# Patient Record
Sex: FEMALE | Race: WHITE | NOT HISPANIC OR LATINO | Employment: FULL TIME | URBAN - METROPOLITAN AREA
[De-identification: names, ages, dates, MRNs, and addresses within clinical notes are randomized per-mention and may not be internally consistent; named-entity substitution may affect disease eponyms.]

---

## 2017-02-16 ENCOUNTER — OFFICE VISIT (OUTPATIENT)
Dept: MEDICAL GROUP | Facility: MEDICAL CENTER | Age: 42
End: 2017-02-16
Attending: FAMILY MEDICINE
Payer: MEDICAID

## 2017-02-16 ENCOUNTER — HOSPITAL ENCOUNTER (OUTPATIENT)
Dept: LAB | Facility: MEDICAL CENTER | Age: 42
End: 2017-02-16
Attending: FAMILY MEDICINE
Payer: MEDICAID

## 2017-02-16 VITALS
SYSTOLIC BLOOD PRESSURE: 121 MMHG | BODY MASS INDEX: 29.25 KG/M2 | OXYGEN SATURATION: 99 % | TEMPERATURE: 97.5 F | RESPIRATION RATE: 18 BRPM | DIASTOLIC BLOOD PRESSURE: 79 MMHG | HEART RATE: 80 BPM | WEIGHT: 182 LBS | HEIGHT: 66 IN

## 2017-02-16 DIAGNOSIS — R05.9 COUGH: ICD-10-CM

## 2017-02-16 DIAGNOSIS — R42 DIZZINESS: ICD-10-CM

## 2017-02-16 DIAGNOSIS — R68.89 SENSATION OF FEELING COLD: ICD-10-CM

## 2017-02-16 DIAGNOSIS — J02.9 SORE THROAT: ICD-10-CM

## 2017-02-16 LAB
BASOPHILS # BLD AUTO: 0.04 K/UL (ref 0–0.12)
BASOPHILS NFR BLD AUTO: 0.5 % (ref 0–1.8)
EOSINOPHIL # BLD: 0.14 K/UL (ref 0–0.51)
EOSINOPHIL NFR BLD AUTO: 1.7 % (ref 0–6.9)
ERYTHROCYTE [DISTWIDTH] IN BLOOD BY AUTOMATED COUNT: 49 FL (ref 35.9–50)
HCT VFR BLD AUTO: 41.3 % (ref 37–47)
HETEROPH AB SER QL LA: NEGATIVE
HGB BLD-MCNC: 13.6 G/DL (ref 12–16)
IMM GRANULOCYTES # BLD AUTO: 0.01 K/UL (ref 0–0.11)
IMM GRANULOCYTES NFR BLD AUTO: 0.1 % (ref 0–0.9)
INT CON NEG: NORMAL
INT CON POS: NORMAL
LYMPHOCYTES # BLD: 1.74 K/UL (ref 1–4.8)
LYMPHOCYTES NFR BLD AUTO: 21 % (ref 22–41)
MCH RBC QN AUTO: 31.5 PG (ref 27–33)
MCHC RBC AUTO-ENTMCNC: 32.9 G/DL (ref 33.6–35)
MCV RBC AUTO: 95.6 FL (ref 81.4–97.8)
MONOCYTES # BLD: 0.44 K/UL (ref 0–0.85)
MONOCYTES NFR BLD AUTO: 5.3 % (ref 0–13.4)
NEUTROPHILS # BLD: 5.9 K/UL (ref 2–7.15)
NEUTROPHILS NFR BLD AUTO: 71.4 % (ref 44–72)
NRBC # BLD AUTO: 0 K/UL
NRBC BLD-RTO: 0 /100 WBC
PLATELET # BLD AUTO: 205 K/UL (ref 164–446)
PMV BLD AUTO: 11.2 FL (ref 9–12.9)
RBC # BLD AUTO: 4.32 M/UL (ref 4.2–5.4)
S PYO AG THROAT QL: NORMAL
T4 FREE SERPL-MCNC: 0.77 NG/DL (ref 0.53–1.43)
TSH SERPL DL<=0.005 MIU/L-ACNC: 2.62 UIU/ML (ref 0.3–3.7)
WBC # BLD AUTO: 8.3 K/UL (ref 4.8–10.8)

## 2017-02-16 PROCEDURE — 99214 OFFICE O/P EST MOD 30 MIN: CPT | Performed by: FAMILY MEDICINE

## 2017-02-16 PROCEDURE — 36415 COLL VENOUS BLD VENIPUNCTURE: CPT

## 2017-02-16 PROCEDURE — 86308 HETEROPHILE ANTIBODY SCREEN: CPT

## 2017-02-16 PROCEDURE — 99212 OFFICE O/P EST SF 10 MIN: CPT | Performed by: FAMILY MEDICINE

## 2017-02-16 PROCEDURE — 84439 ASSAY OF FREE THYROXINE: CPT

## 2017-02-16 PROCEDURE — 85025 COMPLETE CBC W/AUTO DIFF WBC: CPT

## 2017-02-16 PROCEDURE — 87880 STREP A ASSAY W/OPTIC: CPT | Performed by: FAMILY MEDICINE

## 2017-02-16 PROCEDURE — 84443 ASSAY THYROID STIM HORMONE: CPT

## 2017-02-16 RX ORDER — AMOXICILLIN AND CLAVULANATE POTASSIUM 875; 125 MG/1; MG/1
1 TABLET, FILM COATED ORAL 2 TIMES DAILY
Qty: 20 TAB | Refills: 0 | Status: SHIPPED | OUTPATIENT
Start: 2017-02-16 | End: 2017-03-21

## 2017-02-16 NOTE — MR AVS SNAPSHOT
"        Sadaf Nunez   2017 1:50 PM   Office Visit   MRN: [id number]    [facility]   Dept Phone:  [phone]    Description:  Female : 1975   Provider:  Penny Silverio M.D.           Reason for Visit     Chills     Fever           Allergies as of 2017     Allergen Noted Reactions    Keflex [Cephalexin-Fd&C #2-Fd&C Yellow #10] 2016       Yeast infection      You were diagnosed with     Sore throat   [215100]       Dizziness   [982453]       Sensation of feeling cold   [791890]       Cough   [786.2.ICD-9-CM]         Vital Signs     Blood Pressure Pulse Temperature Respirations Height Weight    121/79 mmHg 80 36.4 °C (97.5 °F) 18 1.676 m (5' 5.98\") 82.555 kg (182 lb)    Body Mass Index Oxygen Saturation Last Menstrual Period Breastfeeding? Smoking Status       29.39 kg/m2 99% 2016 No Current Every Day Smoker       Basic Information     Date Of Birth Sex Race Ethnicity Preferred Language    1975 Female White Non- English      Problem List              ICD-10-CM Priority Class Noted - Resolved    Flank pain R10.9   2016 - Present    Anxiety F41.9   2016 - Present    Depression F32.9   2016 - Present    History of abnormal cervical Pap smear Z87.898   2016 - Present    Insomnia G47.00   2016 - Present    Hemorrhagic ovarian cyst N83.209   2016 - Present    Pain in the chest R07.9   2016 - Present    Dysfunctional uterine bleeding N93.8   2016 - Present    Irregular menstrual cycle N92.6   5/3/2016 - Present      Health Maintenance        Date Due Completion Dates    IMM DTaP/Tdap/Td Vaccine (1 - Tdap) 1994 ---    MAMMOGRAM 2015 ---    IMM INFLUENZA (1) 2016 ---    PAP SMEAR 2019            Current Immunizations     No immunizations on file.      Below and/or attached are the medications your provider expects you to take. Review all of your home medications and newly ordered " medications with your provider and/or pharmacist. Follow medication instructions as directed by your provider and/or pharmacist. Please keep your medication list with you and share with your provider. Update the information when medications are discontinued, doses are changed, or new medications (including over-the-counter products) are added; and carry medication information at all times in the event of emergency situations     Allergies:  KEFLEX - (reactions not documented)               Medications  Valid as of: February 16, 2017 -  2:02 PM    Generic Name Brand Name Tablet Size Instructions for use    Amoxicillin-Pot Clavulanate (Tab) AUGMENTIN 875-125 MG Take 1 Tab by mouth 2 times a day.        Ibuprofen (Tab) MOTRIN 600 MG Take 1 Tab by mouth every 6 hours as needed.        PARoxetine HCl (Tab) PAXIL 30 MG Take 1 Tab by mouth every day.        TraZODone HCl (Tab) DESYREL 100 MG Take 1 Tab by mouth 1 time daily as needed for Sleep.        .                 Medicines prescribed today were sent to:     [address] [address]    Phone: [phone] Fax: [phone]    Open 24 Hours?: No      Medication refill instructions:       If your prescription bottle indicates you have medication refills left, it is not necessary to call your provider’s office. Please contact your pharmacy and they will refill your medication.    If your prescription bottle indicates you do not have any refills left, you may request refills at any time through one of the following ways: The online U[employer] Mobile system (except Urgent Care), by calling your provider’s office, or by asking your pharmacy to contact your provider’s office with a refill request. Medication refills are processed only during regular business hours and may not be available until the next business day. Your provider may request additional information or to have a follow-up visit with you prior to refilling  your medication.   *Please Note: Medication refills are assigned a new Rx number when refilled electronically. Your pharmacy may indicate that no refills were authorized even though a new prescription for the same medication is available at the pharmacy. Please request the medicine by name with the pharmacy before contacting your provider for a refill.        Your To Do List     Future Labs/Procedures Complete By Expires    CBC WITH DIFFERENTIAL  As directed 2/16/2018    FREE THYROXINE  As directed 2/16/2018    MONONUCLEOSIS TEST QUAL  As directed 2/16/2018    TSH  As directed 2/16/2018         Giftbar Access Code: [email or web address]  Expires: 3/18/2017  2:02 PM    Giftbar  A secure, online tool to manage your health information     [employer]’s Giftbar® is a secure, online tool that connects you to your personalized health information from the privacy of your home -- day or night - making it very easy for you to manage your healthcare. Once the activation process is completed, you can even access your medical information using the Giftbar avni, which is available for free in the Apple Avni store or Google Play store.     Giftbar provides the following levels of access (as shown below):   My Chart Features   [facility] Doctor [facility]  Specialists [facility]  Urgent  Care Non-[facility]  Primary Care  Doctor   Email your healthcare team securely and privately 24/7 X X X    Manage appointments: schedule your next appointment; view details of past/upcoming appointments X      Request prescription refills. X      View recent personal medical records, including lab and immunizations X X X X   View health record, including health history, allergies, medications X X X X   Read reports about your outpatient visits, procedures, consult and ER notes X X X X   See your discharge summary, which is a recap of your hospital and/or ER visit that includes your diagnosis, lab results, and care plan. X X       How to register for  MyChart:  1. Go to  [email or web address].  2. Click on the Sign Up Now box, which takes you to the New Member Sign Up page. You will need to provide the following information:  a. Enter your [employer] Access Code exactly as it appears at the top of this page. (You will not need to use this code after you’ve completed the sign-up process. If you do not sign up before the expiration date, you must request a new code.)   b. Enter your date of birth.   c. Enter your home email address.   d. Click Submit, and follow the next screen’s instructions.  3. Create a Jiujiuweikangt ID. This will be your [employer] login ID and cannot be changed, so think of one that is secure and easy to remember.  4. Create a Jiujiuweikangt password. You can change your password at any time.  5. Enter your Password Reset Question and Answer. This can be used at a later time if you forget your password.   6. Enter your e-mail address. This allows you to receive e-mail notifications when new information is available in [employer].  7. Click Sign Up. You can now view your health information.    For assistance activating your [employer] account, call [phone]

## 2017-02-16 NOTE — PROGRESS NOTES
Chief Complaint:   Chief Complaint   Patient presents with   • Chills   • Fever       HPI: Established patient, here today accompanied with her mother in law  Sadaf Nunez is a 41 y.o. female who presents for evaluation of acute symptoms of sore throat, fever, cough     Sore throat  Reports having the sore throat for the past 4 days, patient said it's more on the left side, radiating to her ear, admits having contact with sick people in the house, patient said there are 11 people in an apartment. Reports hoarseness of voice. Fever at home. When she checked it at home.    Dizziness  Reports that she sometimes feels dizzy, especially when she gets up. Recently couple of months ago underwent vaginal hysterectomy. She said she was advised to check her hemoglobin because of feeling cold and feeling dizzy     Sensation of feeling cold  Patient says she feels cold all the time, also reports having constipation.     Cough  Patient reports cough for at least 2 weeks now, but she said overall she's not been feeling well for the past couple of months, cough is productive dark in color sputum with streaks of blood. Sometimes, associated with subjective fever, sick contacts. Denies chest pain or palpitations or shortness of breath            Past medical history, family history, social history and medications reviewed and updated in the record. Today   Current medications, problem list and allergies reviewed in EPIC today   Health maintenance topics are reviewed and updated.    Patient Active Problem List    Diagnosis Date Noted   • Irregular menstrual cycle 05/03/2016   • Pain in the chest 04/21/2016   • Dysfunctional uterine bleeding 04/21/2016   • Hemorrhagic ovarian cyst 02/23/2016   • History of abnormal cervical Pap smear 02/05/2016   • Insomnia 02/05/2016   • Flank pain 01/29/2016   • Anxiety 01/29/2016   • Depression 01/29/2016     Family History   Problem Relation Age of Onset   • Diabetes Mother    •  "Psychiatry Mother    • Cancer Maternal Grandfather      cancer stomach     Social History     Social History   • Marital Status: Single     Spouse Name: N/A   • Number of Children: N/A   • Years of Education: N/A     Occupational History   • Not on file.     Social History Main Topics   • Smoking status: Current Every Day Smoker -- 1.50 packs/day for 20 years     Types: Cigarettes   • Smokeless tobacco: Not on file   • Alcohol Use: 0.0 oz/week     0 Standard drinks or equivalent per week      Comment: \"Maybe once a month\"   • Drug Use: No   • Sexual Activity:     Partners: Male     Other Topics Concern   • Not on file     Social History Narrative     Current Outpatient Prescriptions   Medication Sig Dispense Refill   • amoxicillin-clavulanate (AUGMENTIN) 875-125 MG Tab Take 1 Tab by mouth 2 times a day. 20 Tab 0   • ibuprofen (MOTRIN) 600 MG Tab Take 1 Tab by mouth every 6 hours as needed. 30 Tab 3   • paroxetine (PAXIL) 30 MG Tab Take 1 Tab by mouth every day. 30 Tab 11   • trazodone (DESYREL) 100 MG Tab Take 1 Tab by mouth 1 time daily as needed for Sleep. 30 Tab 5     No current facility-administered medications for this visit.           Review Of Systems  As documented in HPI above  PHYSICAL EXAMINATION:    /79 mmHg  Pulse 80  Temp(Src) 36.4 °C (97.5 °F)  Resp 18  Ht 1.676 m (5' 5.98\")  Wt 82.555 kg (182 lb)  BMI 29.39 kg/m2  SpO2 99%  LMP 05/01/2016  Breastfeeding? No  Gen.: Well-developed, well-nourished, no apparent distress, pleasant and cooperative with the examination  HEENT: Normocephalic/atraumatic, sinuses nontender with palpation, TMs clear, nares patent with pink mucosa and clear rhinorrhea, noted mild throat erythema without exudate     Neck: No JVD or bruits, no adenopathy  Cor: Regular rate and rhythm without murmur gallop or rub  Lungs: Clear to auscultation with equal breath sounds bilaterally. No wheezes, rhonchi.  Abdomen: Soft nontender without hepatosplenomegaly or masses " appreciated, normoactive bowel sounds  Extremities: No cyanosis, clubbing or edema          ASSESSMENT/Plan:        1. Sore throat   acute pharyngitis, negative rapid strep test of the office today, rule out infectious mononucleosis, encouraged to increase hydration, supportive care    MONONUCLEOSIS TEST QUAL   2. Dizziness   check hemoglobin. Rule out anemia    CBC WITH DIFFERENTIAL   3. Sensation of feeling cold    Rule out thyroid disease      TSH    FREE THYROXINE   4. Cough    Duration more than 2 weeks now, treat with Augmentin, advised hydration, supportive care.      amoxicillin-clavulanate (AUGMENTIN) 875-125 MG Tab     advised to come back in 1-2 weeks if no improvement. Clinically stable at this time. No red flags    Please note that this dictation was created using voice recognition software. I have made every reasonable attempt to correct obvious errors but there may be errors of grammar and content that I may have overlooked prior to finalization of this note.

## 2017-02-23 ENCOUNTER — TELEPHONE (OUTPATIENT)
Dept: MEDICAL GROUP | Facility: MEDICAL CENTER | Age: 42
End: 2017-02-23

## 2017-02-23 DIAGNOSIS — B37.31 YEAST VAGINITIS: ICD-10-CM

## 2017-02-23 RX ORDER — FLUCONAZOLE 150 MG/1
150 TABLET ORAL DAILY
Qty: 1 TAB | Refills: 0 | Status: SHIPPED | OUTPATIENT
Start: 2017-02-23 | End: 2017-03-21 | Stop reason: SDUPTHER

## 2017-02-23 NOTE — TELEPHONE ENCOUNTER
Patient was taking amoxicillin and developed a yeast infection. She is done with the medication, she used a cream but it didn't help. Is there anything you can prescribe? Please advise. I will call her  Paresh [phone]

## 2017-03-19 ENCOUNTER — HOSPITAL ENCOUNTER (EMERGENCY)
Facility: MEDICAL CENTER | Age: 42
End: 2017-03-19
Attending: EMERGENCY MEDICINE
Payer: MEDICAID

## 2017-03-19 ENCOUNTER — APPOINTMENT (OUTPATIENT)
Dept: RADIOLOGY | Facility: MEDICAL CENTER | Age: 42
End: 2017-03-19
Attending: EMERGENCY MEDICINE
Payer: MEDICAID

## 2017-03-19 VITALS
DIASTOLIC BLOOD PRESSURE: 58 MMHG | TEMPERATURE: 98.7 F | WEIGHT: 183.64 LBS | SYSTOLIC BLOOD PRESSURE: 116 MMHG | RESPIRATION RATE: 18 BRPM | OXYGEN SATURATION: 97 % | HEIGHT: 66 IN | BODY MASS INDEX: 29.51 KG/M2 | HEART RATE: 92 BPM

## 2017-03-19 DIAGNOSIS — J90 PLEURAL EFFUSION: ICD-10-CM

## 2017-03-19 DIAGNOSIS — Z72.0 TOBACCO ABUSE: ICD-10-CM

## 2017-03-19 DIAGNOSIS — R05.9 COUGH: ICD-10-CM

## 2017-03-19 PROCEDURE — 99284 EMERGENCY DEPT VISIT MOD MDM: CPT

## 2017-03-19 PROCEDURE — 71020 DX-CHEST-2 VIEWS: CPT

## 2017-03-19 RX ORDER — BENZONATATE 100 MG/1
100 CAPSULE ORAL 3 TIMES DAILY PRN
Qty: 20 CAP | Refills: 0 | Status: SHIPPED | OUTPATIENT
Start: 2017-03-19 | End: 2020-08-24

## 2017-03-19 RX ORDER — ALBUTEROL SULFATE 90 UG/1
2 AEROSOL, METERED RESPIRATORY (INHALATION) EVERY 6 HOURS PRN
Qty: 8.5 G | Refills: 0 | Status: SHIPPED | OUTPATIENT
Start: 2017-03-19 | End: 2017-09-21

## 2017-03-19 RX ORDER — ALBUTEROL SULFATE 90 UG/1
2 AEROSOL, METERED RESPIRATORY (INHALATION) EVERY 6 HOURS PRN
Qty: 8.5 G | Refills: 0 | Status: SHIPPED | OUTPATIENT
Start: 2017-03-19 | End: 2018-11-01

## 2017-03-19 RX ORDER — PREDNISONE 20 MG/1
20 TABLET ORAL 2 TIMES DAILY
Qty: 10 TAB | Refills: 0 | Status: SHIPPED | OUTPATIENT
Start: 2017-03-19 | End: 2017-03-24

## 2017-03-19 ASSESSMENT — PAIN SCALES - GENERAL: PAINLEVEL_OUTOF10: 3

## 2017-03-19 NOTE — ED AVS SNAPSHOT
Home Care Instructions                                                                                                                Sadaf Nunez   MRN: [id number]    Department:  [facility], Emergency Dept   Date of Visit:  3/19/2017            [facility], Emergency Dept    [address] [facility] [address]    Phone:  [phone]      You were seen by     Shane Damian M.D.      Your Diagnosis Was     Cough     R05       Follow-up Information     1. Call Penny Silverio M.D..    Specialty:  Family Medicine    Why:  call the office 1st thing in the morning and arrange office recheck this week    Contact information    [address]  [address]  [facility] [address] [phone]        Medication Information     Review all of your home medications and newly ordered medications with your primary doctor and/or pharmacist as soon as possible. Follow medication instructions as directed by your doctor and/or pharmacist.     Please keep your complete medication list with you and share with your physician. Update the information when medications are discontinued, doses are changed, or new medications (including over-the-counter products) are added; and carry medication information at all times in the event of emergency situations.               Medication List      START taking these medications        Instructions    Morning Afternoon Evening Bedtime    * albuterol 108 (90 BASE) MCG/ACT Aers inhalation aerosol        Inhale 2 Puffs by mouth every 6 hours as needed for Shortness of Breath.   Dose:  2 Puff                        * albuterol 108 (90 BASE) MCG/ACT Aers inhalation aerosol        Inhale 2 Puffs by mouth every 6 hours as needed for Shortness of Breath.   Dose:  2 Puff                        benzonatate 100 MG Caps   Commonly known as:  TESSALON        Take 1 Cap by mouth 3 times a day as needed for Cough.   Dose:  100 mg                        predniSONE 20 MG Tabs      Commonly known as:  DELTASONE        Take 1 Tab by mouth 2 times a day for 5 days.   Dose:  20 mg                        * Notice:  This list has 2 medication(s) that are the same as other medications prescribed for you. Read the directions carefully, and ask your doctor or other care provider to review them with you.      ASK your doctor about these medications        Instructions    Morning Afternoon Evening Bedtime    amoxicillin-clavulanate 875-125 MG Tabs   Commonly known as:  AUGMENTIN        Take 1 Tab by mouth 2 times a day.   Dose:  1 Tab                        fluconazole 150 MG tablet   Commonly known as:  DIFLUCAN        Take 1 Tab by mouth every day.   Dose:  150 mg                        ibuprofen 600 MG Tabs   Commonly known as:  MOTRIN        Take 1 Tab by mouth every 6 hours as needed.   Dose:  600 mg                        paroxetine 30 MG Tabs   Commonly known as:  PAXIL        Take 1 Tab by mouth every day.   Dose:  30 mg                        trazodone 100 MG Tabs   Commonly known as:  DESYREL        Take 1 Tab by mouth 1 time daily as needed for Sleep.   Dose:  100 mg                             Where to Get Your Medications      These medications were sent to [facility] - [address] [address]     Phone:  [phone]    - albuterol 108 (90 BASE) MCG/ACT Aers inhalation aerosol      You can get these medications from any pharmacy     Bring a paper prescription for each of these medications    - albuterol 108 (90 BASE) MCG/ACT Aers inhalation aerosol  - benzonatate 100 MG Caps  - predniSONE 20 MG Tabs            Procedures and tests performed during your visit     DX-CHEST-2 VIEWS        Discharge Instructions       See your doctor this week for further evaluation and treatment. Return here if you feel your developing new or worsening symptoms.          Patient Information     Patient Information    Following emergency treatment: all patient  requiring follow-up care must return either to a private physician or a clinic if your condition worsens before you are able to obtain further medical attention, please return to the emergency room.     Billing Information    At [facility], we work to make the billing process streamlined for our patients.  Our Representatives are here to answer any questions you may have regarding your hospital bill.  If you have insurance coverage and have supplied your insurance information to us, we will submit a claim to your insurer on your behalf.  Should you have any questions regarding your bill, we can be reached online or by phone as follows:  Online: You are able pay your bills online or live chat with our representatives about any billing questions you may have. We are here to help Monday - Friday from 8:00am to 7:30pm and 9:00am - 12:00pm on Saturdays.  Please visit [email or web address]  for more information.   Phone:  [phone] or [phone]    Please note that your emergency physician, surgeon, pathologist, radiologist, anesthesiologist, and other specialists are not employed by [facility] and will therefore bill separately for their services.  Please contact them directly for any questions concerning their bills at the numbers below:     Emergency Physician Services:  [phone]  [facility]:  [phone]  [facility]:  [phone]  [facility]:  [phone]    1. Your final bill may vary from the amount quoted upon discharge if all procedures are not complete at that time, or if your doctor has additional procedures of which we are not aware. You will receive an additional bill if you return to the Emergency Department at [facility] for suture removal regardless of the facility of which the sutures were placed.     2. Please arrange for settlement of this account at the emergency registration.    3. All self-pay accounts  are due in full at the time of treatment.  If you are unable to meet this obligation then payment is expected within 4-5 days.     4. If you have had radiology studies (CT, X-ray, Ultrasound, MRI), you have received a preliminary result during your emergency department visit. Please contact the radiology department [phone] to receive a copy of your final result. Please discuss the Final result with your primary physician or with the follow up physician provided.     Crisis Hotline:  [place] Crisis Hotline:  4-224-KWFTMFS or 1-469.536.3518  [place] Crisis Hotline:    1-189.873.4433 or 935-127-9003         ED Discharge Follow Up Questions    1. In order to provide you with very good care, we would like to follow up with a phone call in the next few days.  May we have your permission to contact you?     YES /  NO    2. What is the best phone number to call you? (       )_____-__________    3. What is the best time to call you?      Morning  /  Afternoon  /  Evening                   Patient Signature:  ____________________________________________________________    Date:  ____________________________________________________________

## 2017-03-19 NOTE — ED AVS SNAPSHOT
VeriFone Access Code: [email or web address]  Expires: 4/18/2017  8:21 PM    VeriFone  A secure, online tool to manage your health information     [employer]’s VeriFone® is a secure, online tool that connects you to your personalized health information from the privacy of your home -- day or night - making it very easy for you to manage your healthcare. Once the activation process is completed, you can even access your medical information using the VeriFone avni, which is available for free in the Apple Avni store or Google Play store.     VeriFone provides the following levels of access (as shown below):   My Chart Features   [facility] Primary Care Doctor [facility]  Specialists [facility]  Urgent  Care Non-[facility]  Primary Care  Doctor   Email your healthcare team securely and privately 24/7 X X X X   Manage appointments: schedule your next appointment; view details of past/upcoming appointments X      Request prescription refills. X      View recent personal medical records, including lab and immunizations X X X X   View health record, including health history, allergies, medications X X X X   Read reports about your outpatient visits, procedures, consult and ER notes X X X X   See your discharge summary, which is a recap of your hospital and/or ER visit that includes your diagnosis, lab results, and care plan. X X       How to register for VeriFone:  1. Go to  [email or web address].  2. Click on the Sign Up Now box, which takes you to the New Member Sign Up page. You will need to provide the following information:  a. Enter your VeriFone Access Code exactly as it appears at the top of this page. (You will not need to use this code after you’ve completed the sign-up process. If you do not sign up before the expiration date, you must request a new code.)   b. Enter your date of birth.   c. Enter your home email address.   d. Click Submit, and follow the next screen’s instructions.  3. Create a VeriFone ID. This will be your VeriFone  login ID and cannot be changed, so think of one that is secure and easy to remember.  4. Create a [employer] other [employer] you bake password. You can change your password at any time.  5. Enter your Password Reset Question and Answer. This can be used at a later time if you forget your password.   6. Enter your e-mail address. This allows you to receive e-mail notifications when new information is available in [employer] other things you bake.  7. Click Sign Up. You can now view your health information.    For assistance activating your [employer] other things you bake account, call [phone]

## 2017-03-19 NOTE — ED AVS SNAPSHOT
3/19/2017          Sadaf Nunez  [address]  [facility] [address]    Dear Sadaf:    [facility] wants to ensure your discharge home is safe and you or your loved ones have had all your questions answered regarding your care after you leave the hospital.    You may receive a telephone call within two days of your discharge.  This call is to make certain you understand your discharge instructions as well as ensure we provided you with the best care possible during your stay with us.     The call will only last approximately 3-5 minutes and will be done by a nurse.    Once again, we want to ensure your discharge home is safe and that you have a clear understanding of any next steps in your care.  If you have any questions or concerns, please do not hesitate to contact us, we are here for you.  Thank you for choosing [facility] for your healthcare needs.    Sincerely,    Crescencio Palacios    [facility]

## 2017-03-20 NOTE — ED PROVIDER NOTES
"ED Provider Note    CHIEF COMPLAINT  Chief Complaint   Patient presents with   • Cough   • Congestion       HPI  Sadaf Nunez is a 41 y.o. female who presents to the emergency department complaining of cough and congestion. The patient says that this is been going on for more than one month. She was seen by her primary care doctor about a month ago and started on Augmentin but says that she developed a yeast infection so stopped taking the medication and never followed up with her doctor. She continues to cough and sometimes coughs so bad that she has posttussive emesis the patient continues to smoke. The patient says that she has used an albuterol inhaler in the past and this was helpful but she no longer has the inhaler.    REVIEW OF SYSTEMS no fever no hemoptysis no chest pain. All other systems negative    PAST MEDICAL HISTORY  Past Medical History   Diagnosis Date   • Anxiety    • History of abnormal cervical Pap smear    • Indigestion    • Gynecological disorder      \"Vaginal bleeding\"   • Bipolar 1 disorder (CMS-[facility])    • Anxiety and depression    • Heart burn    • Hemorrhagic disorder (CMS-H[facility])    • UTI (lower urinary tract infection)      Pt. Denies at this time 4/22/16       FAMILY HISTORY  Family History   Problem Relation Age of Onset   • Diabetes Mother    • Psychiatry Mother    • Cancer Maternal Grandfather      cancer stomach       SOCIAL HISTORY  Social History     Social History   • Marital Status: Single     Spouse Name: N/A   • Number of Children: N/A   • Years of Education: N/A     Social History Main Topics   • Smoking status: Current Every Day Smoker -- 1.50 packs/day for 20 years     Types: Cigarettes   • Smokeless tobacco: Not on file   • Alcohol Use: 0.0 oz/week     0 Standard drinks or equivalent per week      Comment: \"Maybe once a month\"   • Drug Use: No   • Sexual Activity:     Partners: Male     Other Topics Concern   • Not on file     Social History Narrative       SURGICAL " "HISTORY  Past Surgical History   Procedure Laterality Date   • Tonsillectomy     • Tubal ligation     • Cholecystectomy     • Gyn surgery       \"7-1995 [facility]\"   • Vaginal hysterectomy scope total N/A 5/3/2016     Procedure: VAGINAL HYSTERECTOMY SCOPE TOTAL WITH MELODY SALPINGECTOMY;  Surgeon: Devon Gates M.D.;  Location: SURGERY SAME DAY [facility];  Service:    • Anterior and posterior repair N/A 5/3/2016     Procedure: ANTERIOR AND POSTERIOR REPAIR;  Surgeon: Devon Gates M.D.;  Location: SURGERY SAME DAY [facility];  Service:    • Enterocele repair N/A 5/3/2016     Procedure: ENTEROCELE REPAIR, PERINEOPLASTY;  Surgeon: Devon Gates M.D.;  Location: SURGERY SAME DAY [facility];  Service:    • Vaginal suspension N/A 5/3/2016     Procedure: VAGINAL SUSPENSION SACROSPINOUS VAULT;  Surgeon: Devon Gates M.D.;  Location: SURGERY SAME DAY [facility];  Service:    • Bladder sling female N/A 5/3/2016     Procedure: BLADDER SLING FEMALE TOT;  Surgeon: Devon Gates M.D.;  Location: SURGERY SAME DAY [facility];  Service:        CURRENT MEDICATIONS  Home Medications     **Home medications have not yet been reviewed for this encounter**          ALLERGIES  Allergies   Allergen Reactions   • Keflex [Cephalexin-Fd&C #2-Fd&C Yellow #10]      Yeast infection       PHYSICAL EXAM  VITAL SIGNS: /58 mmHg  Pulse 92  Temp(Src) 37.1 °C (98.7 °F)  Resp 18  Ht 1.676 m (5' 6\")  Wt 83.3 kg (183 lb 10.3 oz)  BMI 29.65 kg/m2  SpO2 97%   Oxygen saturation is interpreted as adequate  Constitutional: Awake and verbal and nontoxic appearing  HENT: Mucous membranes are moist and throat clear  Eyes: No erythema or discharge or jaundice  Neck: Trachea midline no JVD  Cardiovascular: Regular rate and rhythm  Lungs: Clear and equal bilaterally with no apparent difficulty breathing  Skin: Warm and dry  Musculoskeletal: No leg edema or calf tenderness  Neurologic: Awake and verbal and moving all " extremities    Radiology  DX-CHEST-2 VIEWS   Final Result      Apparent trace bilateral pleural effusions.        MEDICAL DECISION MAKING and DISPOSITION  I have reviewed all the findings with the patient and I have very much encouraged her to stop smoking. I have advised her that we do not start medications for smoking cessation in the emergency department but she can talk to her primary care doctor about starting these. I have written her prescriptions for prednisone and Tessalon and albuterol HFA inhaler. I have advised the patient to call her doctor 1st thing in the morning and arrange office recheck this week. I also advised her that her chest x-ray shows very trace bilateral oral effusions and she is to discuss this with her doctor and may require further evaluation and treatment however I think it will be safe for this to proceed on an outpatient basis    IMPRESSION  1. Chronic cough  2. Trace bilateral pleural effusions  3. Tobacco abuse      Electronically signed by: Shane Damian, 3/19/2017 9:10 PM

## 2017-03-20 NOTE — DISCHARGE INSTRUCTIONS
See your doctor this week for further evaluation and treatment. Return here if you feel your developing new or worsening symptoms.

## 2017-03-20 NOTE — ED NOTES
Pt to triage c/o non productive cough and nasal congestion x 1 month. Denies fever.   Pt advised to return to triage nurse for any changes or concerns.

## 2017-03-20 NOTE — ED NOTES
Verbalizes understanding of discharge and followup instructions. VSS. Given Rx's x3. Ambulates with steady gait to discharge.

## 2017-03-21 ENCOUNTER — HOSPITAL ENCOUNTER (OUTPATIENT)
Facility: MEDICAL CENTER | Age: 42
End: 2017-03-21
Attending: FAMILY MEDICINE
Payer: MEDICAID

## 2017-03-21 ENCOUNTER — OFFICE VISIT (OUTPATIENT)
Dept: MEDICAL GROUP | Facility: MEDICAL CENTER | Age: 42
End: 2017-03-21
Attending: FAMILY MEDICINE
Payer: MEDICAID

## 2017-03-21 VITALS
BODY MASS INDEX: 29.41 KG/M2 | OXYGEN SATURATION: 99 % | WEIGHT: 183 LBS | HEIGHT: 66 IN | RESPIRATION RATE: 18 BRPM | DIASTOLIC BLOOD PRESSURE: 40 MMHG | TEMPERATURE: 97.2 F | SYSTOLIC BLOOD PRESSURE: 120 MMHG | HEART RATE: 80 BPM

## 2017-03-21 DIAGNOSIS — R05.9 COUGH: ICD-10-CM

## 2017-03-21 DIAGNOSIS — R82.90 URINE ABNORMALITY: ICD-10-CM

## 2017-03-21 DIAGNOSIS — B37.31 YEAST VAGINITIS: ICD-10-CM

## 2017-03-21 DIAGNOSIS — Z12.39 BREAST CANCER SCREENING: ICD-10-CM

## 2017-03-21 LAB
APPEARANCE UR: NORMAL
BILIRUB UR STRIP-MCNC: NORMAL MG/DL
COLOR UR AUTO: NORMAL
GLUCOSE UR STRIP.AUTO-MCNC: NORMAL MG/DL
KETONES UR STRIP.AUTO-MCNC: NORMAL MG/DL
LEUKOCYTE ESTERASE UR QL STRIP.AUTO: NORMAL
NITRITE UR QL STRIP.AUTO: NORMAL
PH UR STRIP.AUTO: 6 [PH] (ref 5–8)
PROT UR QL STRIP: NORMAL MG/DL
RBC UR QL AUTO: NORMAL
SP GR UR STRIP.AUTO: 1.03
UROBILINOGEN UR STRIP-MCNC: NORMAL MG/DL

## 2017-03-21 PROCEDURE — 87086 URINE CULTURE/COLONY COUNT: CPT

## 2017-03-21 PROCEDURE — 87077 CULTURE AEROBIC IDENTIFY: CPT

## 2017-03-21 PROCEDURE — 81002 URINALYSIS NONAUTO W/O SCOPE: CPT | Performed by: FAMILY MEDICINE

## 2017-03-21 PROCEDURE — 99213 OFFICE O/P EST LOW 20 MIN: CPT | Performed by: FAMILY MEDICINE

## 2017-03-21 PROCEDURE — 99214 OFFICE O/P EST MOD 30 MIN: CPT | Performed by: FAMILY MEDICINE

## 2017-03-21 PROCEDURE — 87186 SC STD MICRODIL/AGAR DIL: CPT

## 2017-03-21 RX ORDER — SULFAMETHOXAZOLE AND TRIMETHOPRIM 800; 160 MG/1; MG/1
1 TABLET ORAL 2 TIMES DAILY
Qty: 14 TAB | Refills: 0 | Status: SHIPPED | OUTPATIENT
Start: 2017-03-21 | End: 2017-09-21

## 2017-03-21 RX ORDER — FLUCONAZOLE 150 MG/1
150 TABLET ORAL DAILY
Qty: 1 TAB | Refills: 0 | Status: SHIPPED | OUTPATIENT
Start: 2017-03-21 | End: 2017-09-21

## 2017-03-21 RX ORDER — AZITHROMYCIN 250 MG/1
TABLET, FILM COATED ORAL
Qty: 6 TAB | Refills: 0 | Status: SHIPPED | OUTPATIENT
Start: 2017-03-21 | End: 2017-03-21

## 2017-03-21 NOTE — MR AVS SNAPSHOT
"        Sadaf Nunez   3/21/2017 3:50 PM   Office Visit   MRN: [id number]    [facility]   Dept Phone:  [phone]    Description:  Female : 1975   Provider:  Penny Silverio M.D.           Reason for Visit     Follow-Up           Allergies as of 3/21/2017     No Active Allergies      You were diagnosed with     Cough   [786.2.ICD-9-CM]       Urine abnormality   [403880]       Breast cancer screening   [454128]       Yeast vaginitis   [235207]         Vital Signs     Blood Pressure Pulse Temperature Respirations Height Weight    120/40 mmHg 80 36.2 °C (97.2 °F) 18 1.676 m (5' 6\") 83.008 kg (183 lb)    Body Mass Index Oxygen Saturation Breastfeeding? Smoking Status          29.55 kg/m2 99% No Current Every Day Smoker        Basic Information     Date Of Birth Sex Race Ethnicity Preferred Language    1975 Female White Non- English      Problem List              ICD-10-CM Priority Class Noted - Resolved    Flank pain R10.9   2016 - Present    Anxiety F41.9   2016 - Present    Depression F32.9   2016 - Present    History of abnormal cervical Pap smear Z87.898   2016 - Present    Insomnia G47.00   2016 - Present    Hemorrhagic ovarian cyst N83.209   2016 - Present    Pain in the chest R07.9   2016 - Present    Dysfunctional uterine bleeding N93.8   2016 - Present    Irregular menstrual cycle N92.6   5/3/2016 - Present      Health Maintenance        Date Due Completion Dates    IMM DTaP/Tdap/Td Vaccine (1 - Tdap) 1994 ---    MAMMOGRAM 2015 ---    IMM INFLUENZA (1) 2016 ---    PAP SMEAR 2019            Current Immunizations     No immunizations on file.      Below and/or attached are the medications your provider expects you to take. Review all of your home medications and newly ordered medications with your provider and/or pharmacist. Follow medication instructions as directed by your provider and/or " pharmacist. Please keep your medication list with you and share with your provider. Update the information when medications are discontinued, doses are changed, or new medications (including over-the-counter products) are added; and carry medication information at all times in the event of emergency situations     Allergies:  No Known Allergies          Medications  Valid as of: March 21, 2017 -  4:24 PM    Generic Name Brand Name Tablet Size Instructions for use    Albuterol Sulfate (Aero Soln) albuterol 108 (90 BASE) MCG/ACT Inhale 2 Puffs by mouth every 6 hours as needed for Shortness of Breath.        Albuterol Sulfate (Aero Soln) albuterol 108 (90 BASE) MCG/ACT Inhale 2 Puffs by mouth every 6 hours as needed for Shortness of Breath.        Benzonatate (Cap) TESSALON 100 MG Take 1 Cap by mouth 3 times a day as needed for Cough.        Fluconazole (Tab) DIFLUCAN 150 MG Take 1 Tab by mouth every day.        Ibuprofen (Tab) MOTRIN 600 MG Take 1 Tab by mouth every 6 hours as needed.        PARoxetine HCl (Tab) PAXIL 30 MG Take 1 Tab by mouth every day.        PredniSONE (Tab) DELTASONE 20 MG Take 1 Tab by mouth 2 times a day for 5 days.        Sulfamethoxazole-Trimethoprim (Tab) BACTRIM -160 MG Take 1 Tab by mouth 2 times a day.        TraZODone HCl (Tab) DESYREL 100 MG Take 1 Tab by mouth 1 time daily as needed for Sleep.        .                 Medicines prescribed today were sent to:     [facility] [address] [address]    Phone: [phone] Fax: [phone]    Open 24 Hours?: No    [facility]ARM[facility]Y #[id number] - [place], [place] - [address]    [address] [address]    Phone: [phone] Fax: [phone]    Open 24 Hours?: No      Medication refill instructions:       If your prescription bottle indicates you have medication refills left, it is not necessary to call your provider’s office. Please contact your pharmacy and they will refill your  medication.    If your prescription bottle indicates you do not have any refills left, you may request refills at any time through one of the following ways: The online [email or web address] system (except Urgent Care), by calling your provider’s office, or by asking your pharmacy to contact your provider’s office with a refill request. Medication refills are processed only during regular business hours and may not be available until the next business day. Your provider may request additional information or to have a follow-up visit with you prior to refilling your medication.   *Please Note: Medication refills are assigned a new Rx number when refilled electronically. Your pharmacy may indicate that no refills were authorized even though a new prescription for the same medication is available at the pharmacy. Please request the medicine by name with the pharmacy before contacting your provider for a refill.        Your To Do List     Future Labs/Procedures Complete By Expires    URINE CULTURE(NEW)  As directed 3/21/2018         [email or web address] Access Code: [email or web address]  Expires: 4/18/2017  8:21 PM    [email or web address]  A secure, online tool to manage your health information     [employer]’s globa.ly® is a secure, online tool that connects you to your personalized health information from the privacy of your home -- day or night - making it very easy for you to manage your healthcare. Once the activation process is completed, you can even access your medical information using the globa.ly avni, which is available for free in the Apple Avni store or Google Play store.     globa.ly provides the following levels of access (as shown below):   My Chart Features   [facility] Primary [facility] Doctor [facility]  Specialists [facility]  Urgent  Care Non-[facility]  Primary Care  Doctor   Email your healthcare team securely and privately 24/7 X X X    Manage appointments: schedule your next appointment; view details of past/upcoming appointments X      Request prescription  refills. X      View recent personal medical records, including lab and immunizations X X X X   View health record, including health history, allergies, medications X X X X   Read reports about your outpatient visits, procedures, consult and ER notes X X X X   See your discharge summary, which is a recap of your hospital and/or ER visit that includes your diagnosis, lab results, and care plan. X X       How to register for Spectral Image:  1. Go to  [email or web address].  2. Click on the Sign Up Now box, which takes you to the New Member Sign Up page. You will need to provide the following information:  a. Enter your Spectral Image Access Code exactly as it appears at the top of this page. (You will not need to use this code after you’ve completed the sign-up process. If you do not sign up before the expiration date, you must request a new code.)   b. Enter your date of birth.   c. Enter your home email address.   d. Click Submit, and follow the next screen’s instructions.  3. Create a Spectral Image ID. This will be your Spectral Image login ID and cannot be changed, so think of one that is secure and easy to remember.  4. Create a Spectral Image password. You can change your password at any time.  5. Enter your Password Reset Question and Answer. This can be used at a later time if you forget your password.   6. Enter your e-mail address. This allows you to receive e-mail notifications when new information is available in Spectral Image.  7. Click Sign Up. You can now view your health information.    For assistance activating your Spectral Image account, call [phone]        Quit Tobacco Information     Do you want to quit using tobacco?    Quitting tobacco decreases risks of cancer, heart and lung disease, increases life expectancy, improves sense of taste and smell, and increases spending money, among other benefits.    If you are thinking about quitting, we can help.  • [facility] Quit Tobacco Program: [phone]  o Program occurs weekly for four  weeks and includes pharmacist consultation on products to support quitting smoking or chewing tobacco. A provider referral is needed for pharmacist consultation.  • Tobacco Users Help Hotline: 5-027-QUIT-NOW (006-4007) or https://[place].quitlogix.org/  o Free, confidential telephone and online coaching for [place] residents. Sessions are designed on a schedule that is convenient for you. Eligible clients receive free nicotine replacement therapy.  • Nationally: www.smokefree.gov  o Information and professional assistance to support both immediate and long-term needs as you become, and remain, a non-smoker. Smokefree.gov allows you to choose the help that best fits your needs.

## 2017-03-21 NOTE — PROGRESS NOTES
Chief Complaint:   Chief Complaint   Patient presents with   • Follow-Up       HPI: Established patient   Sadaf Nunez is a 41 y.o. female who presents for follow-up after recent ER visit, discussed the following concerns    Cough  Tissue reports this cough for more than one month now, she said she went to emergency room and she was treated with Medrol pack and albuterol inhaler for bronchitis, patient said she did not complete the previous antibiotics course that I prescribed because she developed side effects as yeast infection. She denies fever at this time, cough is continuous, but not productive. Denies dizziness or lightheadedness, no lower extremity edema, no shortness of breath or chest pain at this time. No palpitations.  Reviewed x-ray done at the hospital shows trace bilateral pleural effusion, denies palpitations, chest pain or shortness of breath at this time   Urine abnormality  Reports that for the past one week or so. She is having change in the color of urine, and smell without burning sensation or or increased urinary frequency and lower abdominal pain, denies flank pain, denies fever, nausea, vomiting.     Breast cancer screening    due for Mammogram   Yeast vaginitis    She reports yeast infection described as vaginal itching and cheesy discharge after using antibiotics and requesting antifungal medication. Since she will be using and no antibiotics today. Denies vaginal bleeding or abdominal pain          Past medical history, family history, social history and medications reviewed and updated in the record. Today   Current medications, problem list and allergies reviewed in [facility] today   Health maintenance topics are reviewed and updated.    Patient Active Problem List    Diagnosis Date Noted   • Irregular menstrual cycle 05/03/2016   • Pain in the chest 04/21/2016   • Dysfunctional uterine bleeding 04/21/2016   • Hemorrhagic ovarian cyst 02/23/2016   • History of abnormal cervical Pap  "smear 02/05/2016   • Insomnia 02/05/2016   • Flank pain 01/29/2016   • Anxiety 01/29/2016   • Depression 01/29/2016     Family History   Problem Relation Age of Onset   • Diabetes Mother    • Psychiatry Mother    • Cancer Maternal Grandfather      cancer stomach     Social History     Social History   • Marital Status: Single     Spouse Name: N/A   • Number of Children: N/A   • Years of Education: N/A     Occupational History   • Not on file.     Social History Main Topics   • Smoking status: Current Every Day Smoker -- 1.50 packs/day for 20 years     Types: Cigarettes   • Smokeless tobacco: Not on file   • Alcohol Use: 0.0 oz/week     0 Standard drinks or equivalent per week      Comment: \"Maybe once a month\"   • Drug Use: No   • Sexual Activity:     Partners: Male     Other Topics Concern   • Not on file     Social History Narrative     Current Outpatient Prescriptions   Medication Sig Dispense Refill   • fluconazole (DIFLUCAN) 150 MG tablet Take 1 Tab by mouth every day. 1 Tab 0   • sulfamethoxazole-trimethoprim (BACTRIM DS) 800-160 MG tablet Take 1 Tab by mouth 2 times a day. 14 Tab 0   • predniSONE (DELTASONE) 20 MG Tab Take 1 Tab by mouth 2 times a day for 5 days. 10 Tab 0   • benzonatate (TESSALON) 100 MG Cap Take 1 Cap by mouth 3 times a day as needed for Cough. 20 Cap 0   • albuterol 108 (90 BASE) MCG/ACT Aero Soln inhalation aerosol Inhale 2 Puffs by mouth every 6 hours as needed for Shortness of Breath. 8.5 g 0   • albuterol 108 (90 BASE) MCG/ACT Aero Soln inhalation aerosol Inhale 2 Puffs by mouth every 6 hours as needed for Shortness of Breath. 8.5 g 0   • ibuprofen (MOTRIN) 600 MG Tab Take 1 Tab by mouth every 6 hours as needed. 30 Tab 3   • paroxetine (PAXIL) 30 MG Tab Take 1 Tab by mouth every day. 30 Tab 11   • trazodone (DESYREL) 100 MG Tab Take 1 Tab by mouth 1 time daily as needed for Sleep. 30 Tab 5     No current facility-administered medications for this visit.           Review Of Systems  As " "documented in HPI above  PHYSICAL EXAMINATION:    /40 mmHg  Pulse 80  Temp(Src) 36.2 °C (97.2 °F)  Resp 18  Ht 1.676 m (5' 6\")  Wt 83.008 kg (183 lb)  BMI 29.55 kg/m2  SpO2 99%  Breastfeeding? No  Gen.: Well-developed, well-nourished, no apparent distress, pleasant and cooperative with the examination  HEENT: Normocephalic/atraumatic,     Neck: No JVD or bruits, no adenopathy  Cor: Regular rate and rhythm without murmur gallop or rub  Lungs: Clear to auscultation with equal breath sounds bilaterally. No wheezes, rhonchi.  Abdomen: Soft nontender without hepatosplenomegaly or masses appreciated, normoactive bowel sounds  Extremities: No cyanosis, clubbing or edema          ASSESSMENT/Plan:  1. Cough   advised to continue the albuterol, Medrol pack, I will also add Bactrim to treat possible bronchitis related to bacterial infection and her UTI, advised to quit smoking, increase hydration, push fluids and rest. If not improved to combat for reassessment  Reassured about her chest x-ray. I will repeat another one in the future if patient is symptomatic. Chest x-ray shows trace pleural effusion. Bilateral    sulfamethoxazole-trimethoprim (BACTRIM DS) 800-160 MG tablet    DISCONTINUED: azithromycin (ZITHROMAX) 250 MG Tab   2. Urine abnormality    Urine dip at the office shows evidence of UTI, positive for nitrate, 10 red blood cells, advised to use Bactrim as directed. Increase hydration    URINE CULTURE(NEW)    sulfamethoxazole-trimethoprim (BACTRIM DS) 800-160 MG tablet   3. Breast cancer screening  MA-SCREEN MAMMO W/CAD-BILAT    MA-SCREEN MAMMO W/CAD-BILAT   4. Yeast vaginitis  fluconazole (DIFLUCAN) 150 MG tablet         Please note that this dictation was created using voice recognition software. I have made every reasonable attempt to correct obvious errors but there may be errors of grammar and content that I may have overlooked prior to finalization of this note.       "

## 2017-03-23 LAB
BACTERIA UR CULT: ABNORMAL
SIGNIFICANT IND 70042: ABNORMAL
SOURCE SOURCE: ABNORMAL

## 2017-04-26 ENCOUNTER — HOSPITAL ENCOUNTER (OUTPATIENT)
Dept: RADIOLOGY | Facility: MEDICAL CENTER | Age: 42
End: 2017-04-26
Attending: FAMILY MEDICINE
Payer: MEDICAID

## 2017-04-26 PROCEDURE — G0202 SCR MAMMO BI INCL CAD: HCPCS

## 2017-05-31 ENCOUNTER — OFFICE VISIT (OUTPATIENT)
Dept: MEDICAL GROUP | Facility: MEDICAL CENTER | Age: 42
End: 2017-05-31
Attending: FAMILY MEDICINE
Payer: MEDICAID

## 2017-05-31 VITALS
TEMPERATURE: 98.2 F | WEIGHT: 183 LBS | OXYGEN SATURATION: 99 % | BODY MASS INDEX: 29.41 KG/M2 | HEART RATE: 76 BPM | DIASTOLIC BLOOD PRESSURE: 80 MMHG | RESPIRATION RATE: 16 BRPM | HEIGHT: 66 IN | SYSTOLIC BLOOD PRESSURE: 120 MMHG

## 2017-05-31 DIAGNOSIS — R05.9 COUGH: ICD-10-CM

## 2017-05-31 DIAGNOSIS — J40 BRONCHITIS: ICD-10-CM

## 2017-05-31 DIAGNOSIS — J02.9 PHARYNGITIS, UNSPECIFIED ETIOLOGY: ICD-10-CM

## 2017-05-31 DIAGNOSIS — B37.9 INFECTION DUE TO YEAST: ICD-10-CM

## 2017-05-31 LAB
INT CON NEG: NEGATIVE
INT CON POS: POSITIVE
S PYO AG THROAT QL: NEGATIVE

## 2017-05-31 PROCEDURE — 99214 OFFICE O/P EST MOD 30 MIN: CPT | Performed by: NURSE PRACTITIONER

## 2017-05-31 PROCEDURE — 99212 OFFICE O/P EST SF 10 MIN: CPT | Performed by: NURSE PRACTITIONER

## 2017-05-31 PROCEDURE — 87880 STREP A ASSAY W/OPTIC: CPT | Performed by: NURSE PRACTITIONER

## 2017-05-31 RX ORDER — AZITHROMYCIN 250 MG/1
TABLET, FILM COATED ORAL
Qty: 6 TAB | Refills: 0 | Status: SHIPPED | OUTPATIENT
Start: 2017-05-31 | End: 2017-09-21

## 2017-05-31 RX ORDER — CODEINE PHOSPHATE/GUAIFENESIN 10-100MG/5
5 LIQUID (ML) ORAL 2 TIMES DAILY PRN
Qty: 120 ML | Refills: 0 | Status: SHIPPED | OUTPATIENT
Start: 2017-05-31 | End: 2020-08-24

## 2017-05-31 RX ORDER — BENZONATATE 100 MG/1
100 CAPSULE ORAL 3 TIMES DAILY PRN
Qty: 30 CAP | Refills: 0 | Status: SHIPPED | OUTPATIENT
Start: 2017-05-31 | End: 2017-09-21

## 2017-05-31 RX ORDER — FLUCONAZOLE 150 MG/1
150 TABLET ORAL DAILY
Qty: 2 TAB | Refills: 0 | Status: SHIPPED | OUTPATIENT
Start: 2017-05-31 | End: 2020-08-24

## 2017-05-31 ASSESSMENT — PATIENT HEALTH QUESTIONNAIRE - PHQ9: CLINICAL INTERPRETATION OF PHQ2 SCORE: 0

## 2017-05-31 ASSESSMENT — PAIN SCALES - GENERAL: PAINLEVEL: NO PAIN

## 2017-05-31 NOTE — PROGRESS NOTES
Chief Complaint: sore throat, cough, congestion x 1 week    HPI:  Sadaf presents to the clinic for concern about sore throat and congestion.    Her PCP, Dr Silverio is not available to see Sadaf today.    Her PMH includes  Anxiety and Depression  Bipolar Disorder  Heartburn  Dysfunctional Uterine Bleeding  Hemorrhagic Ovarian Cyst  Abnormal Pap Smear hx  Irregular Menstrual Cycles  Insomnia  Pain in chest  UTI  Tobacco use-Smoker 1.5 ppd ~ 20 years    Review of Records shows  3/21/17 Last Clinic visit -Dr Silverio for ER f/u. Cough, UTI, Yeast Infection, Tx w Bactrim DS and Diflucan.    [place]  Report shows  4/13/17 Xanax 0.25 # 60 cy Esther Hernandes (Psyhciatry)  2/28/17 Xanax 0.25 # 60 by Esther Hernandes  Similar entries in report 6 RX and 1 Prescriber    NKDA      Pharyngitis/ Cough, Congestion   REports a week ago felt ear pain and pressure. Hx of ear infections  Sore throat started 4 days ago and hurts to swallow.  Pt has had cough for 3 days. Cough is yellow thick mucus.  Smoke 1.5 ppd. Not ready to quit but we talked about importance of  Quitting this year and setting a date to quit.  Denies fever or chills.  Has taken cough and cold, Nyquil, Robitussin with little results.  States continues to work and does not want to take time off.  Has next two days off work.  Denies wheezing or SOB.         Patient Active Problem List    Diagnosis Date Noted   • Pharyngitis 05/31/2017   • Irregular menstrual cycle 05/03/2016   • Pain in the chest 04/21/2016   • Dysfunctional uterine bleeding 04/21/2016   • Hemorrhagic ovarian cyst 02/23/2016   • History of abnormal cervical Pap smear 02/05/2016   • Insomnia 02/05/2016   • Flank pain 01/29/2016   • Anxiety 01/29/2016   • Depression 01/29/2016       Allergies:Review of patient's allergies indicates no known allergies.    Current Outpatient Prescriptions   Medication Sig Dispense Refill   • azithromycin (ZITHROMAX) 250 MG Tab Z pack-Take 2 tabs day one and then 1 tab  "daily for a total of 5 days 6 Tab 0   • guaifenesin-codeine (TUSSI-ORGANIDIN NR) 100-10 MG/5ML syrup Take 5 mL by mouth 2 times a day as needed. 120 mL 0   • benzonatate (TESSALON) 100 MG Cap Take 1 Cap by mouth 3 times a day as needed. 30 Cap 0   • fluconazole (DIFLUCAN) 150 MG tablet Take 1 Tab by mouth every day. 2 Tab 0   • fluconazole (DIFLUCAN) 150 MG tablet Take 1 Tab by mouth every day. 1 Tab 0   • sulfamethoxazole-trimethoprim (BACTRIM DS) 800-160 MG tablet Take 1 Tab by mouth 2 times a day. 14 Tab 0   • benzonatate (TESSALON) 100 MG Cap Take 1 Cap by mouth 3 times a day as needed for Cough. 20 Cap 0   • albuterol 108 (90 BASE) MCG/ACT Aero Soln inhalation aerosol Inhale 2 Puffs by mouth every 6 hours as needed for Shortness of Breath. 8.5 g 0   • albuterol 108 (90 BASE) MCG/ACT Aero Soln inhalation aerosol Inhale 2 Puffs by mouth every 6 hours as needed for Shortness of Breath. 8.5 g 0   • ibuprofen (MOTRIN) 600 MG Tab Take 1 Tab by mouth every 6 hours as needed. 30 Tab 3   • paroxetine (PAXIL) 30 MG Tab Take 1 Tab by mouth every day. 30 Tab 11   • trazodone (DESYREL) 100 MG Tab Take 1 Tab by mouth 1 time daily as needed for Sleep. 30 Tab 5     No current facility-administered medications for this visit.       Social History   Substance Use Topics   • Smoking status: Current Every Day Smoker -- 1.50 packs/day for 20 years     Types: Cigarettes   • Smokeless tobacco: Never Used   • Alcohol Use: 0.0 oz/week     0 Standard drinks or equivalent per week      Comment: \"Maybe once a month\"       Family History   Problem Relation Age of Onset   • Diabetes Mother    • Psychiatry Mother    • Cancer Maternal Grandfather      cancer stomach       ROS:  Review of Systems   See HPI Above    Exam:  Temperature 36.8 °C (98.2 °F), height 1.676 m (5' 5.98\"), weight 83.008 kg (183 lb), SpO2 99 %.   Filed Vitals:    05/31/17 1513   BP: 120/80   Pulse: 76   Temp: 36.8 °C (98.2 °F)   Resp: 16   Height: 1.676 m (5' 5.98\") "   Weight: 83.008 kg (183 lb)   SpO2: 99%     General:  Well nourished, well developed female in NAD  HENT:Head is grossly normal. PERRL. Posterior Pharynx red, no exudates. Able to swallow on command.  Neck: Supple. Trachea is midline. Slightly  Bilat tender very small lymph nodes to anterior neck to palpation.  Pulmonary: Scattered rhonchi to ausculation.  Normal effort. No rales or wheezing.   Cardiovascular: Regular rate and rhythm.  Abdomen-Abdomen is soft  Upper extremities- . Good ROM  Lower extremities- neg for edema, redness  Neuro- A & O x 4. Speech clear and appropriate,very sight hoarse sound per .     Current medications, allergies, and problem list reviewed with patient and updated in  [facility] today.    Assessment/Plan:  1. Pharyngitis, unspecified etiology  POCT Rapid Strep A= Negative    azithromycin (ZITHROMAX) 250 MG Tab   2. Cough  guaifenesin-codeine (TUSSI-ORGANIDIN NR) 100-10 MG/5ML syrup; Instructed not to drive or drink alcohol while taking cough syrup and may need to save for evening if working.    benzonatate (TESSALON) 100 MG Cap   3. Infection due to yeast  fluconazole (DIFLUCAN) 150 MG tablet if symptoms of vaginal yeast infection occur S/P antibiotic completion.   4. Bronchitis  azithromycin (ZITHROMAX) 250 MG Tab   Follow up as needed. Call or return if questions, concerns, or worsening condition.

## 2017-05-31 NOTE — MR AVS SNAPSHOT
"Sadaf Nunez   2017 3:50 PM   Office Visit   MRN: [id number]    [facility]   Dept Phone:  [phone]    Description:  Female : 1975   Provider:  LAUREN Ho           Reason for Visit     Pharyngitis     Otalgia           Allergies as of 2017     No Known Allergies      You were diagnosed with     Pharyngitis, unspecified etiology   [2166966]       Cough   [786.2.ICD-9-CM]       Infection due to yeast   [244481]       Bronchitis   [154905]         Vital Signs     Blood Pressure Pulse Temperature Respirations Height Weight    120/80 mmHg 76 36.8 °C (98.2 °F) 16 1.676 m (5' 5.98\") 83.008 kg (183 lb)    Body Mass Index Oxygen Saturation Smoking Status             29.55 kg/m2 99% Current Every Day Smoker         Basic Information     Date Of Birth Sex Race Ethnicity Preferred Language    1975 Female White Non- English      Your appointments     May 31, 2017  3:50 PM   Established Patient with LAUREN Ho   [facility] ([facility])    [address] [place] [id number]   [phone]           You will be receiving a confirmation call a few days before your appointment from our automated call confirmation system.              Problem List              ICD-10-CM Priority Class Noted - Resolved    Flank pain R10.9   2016 - Present    Anxiety F41.9   2016 - Present    Depression F32.9   2016 - Present    History of abnormal cervical Pap smear Z87.898   2016 - Present    Insomnia G47.00   2016 - Present    Hemorrhagic ovarian cyst N83.209   2016 - Present    Pain in the chest R07.9   2016 - Present    Dysfunctional uterine bleeding N93.8   2016 - Present    Irregular menstrual cycle N92.6   5/3/2016 - Present    Pharyngitis J02.9   2017 - Present      Health Maintenance        Date Due Completion Dates    IMM DTaP/Tdap/Td Vaccine (1 - Tdap) 1994 ---    MAMMOGRAM 2018 " 4/26/2017    PAP SMEAR 2/5/2019 2/5/2016            Results     POCT Rapid Strep A      Component    Rapid Strep Screen    negative    Internal Control Positive    Positive    Internal Control Negative    Negative                        Current Immunizations     No immunizations on file.      Below and/or attached are the medications your provider expects you to take. Review all of your home medications and newly ordered medications with your provider and/or pharmacist. Follow medication instructions as directed by your provider and/or pharmacist. Please keep your medication list with you and share with your provider. Update the information when medications are discontinued, doses are changed, or new medications (including over-the-counter products) are added; and carry medication information at all times in the event of emergency situations     Allergies:  No Known Allergies          Medications  Valid as of: May 31, 2017 -  3:42 PM    Generic Name Brand Name Tablet Size Instructions for use    Albuterol Sulfate (Aero Soln) albuterol 108 (90 BASE) MCG/ACT Inhale 2 Puffs by mouth every 6 hours as needed for Shortness of Breath.        Albuterol Sulfate (Aero Soln) albuterol 108 (90 BASE) MCG/ACT Inhale 2 Puffs by mouth every 6 hours as needed for Shortness of Breath.        Azithromycin (Tab) ZITHROMAX 250 MG Z pack-Take 2 tabs day one and then 1 tab daily for a total of 5 days        Benzonatate (Cap) TESSALON 100 MG Take 1 Cap by mouth 3 times a day as needed for Cough.        Benzonatate (Cap) TESSALON 100 MG Take 1 Cap by mouth 3 times a day as needed.        Fluconazole (Tab) DIFLUCAN 150 MG Take 1 Tab by mouth every day.        Fluconazole (Tab) DIFLUCAN 150 MG Take 1 Tab by mouth every day.        Guaifenesin-Codeine (Syrup) TUSSI-ORGANIDIN -10 MG/5ML Take 5 mL by mouth 2 times a day as needed.        Ibuprofen (Tab) MOTRIN 600 MG Take 1 Tab by mouth every 6 hours as needed.        PARoxetine HCl (Tab)  PAXIL 30 MG Take 1 Tab by mouth every day.        Sulfamethoxazole-Trimethoprim (Tab) BACTRIM -160 MG Take 1 Tab by mouth 2 times a day.        TraZODone HCl (Tab) DESYREL 100 MG Take 1 Tab by mouth 1 time daily as needed for Sleep.        .                 Medicines prescribed today were sent to:     [facility] - [place], [place] - [address]    [address] [place] [address]    Phone: [phone] Fax: [phone]    Open 24 Hours?: No    [id number] - [place], [place] - [address] [facility]    [address] [place] [address]    Phone: [phone] Fax: [phone]    Open 24 Hours?: No      Medication refill instructions:       If your prescription bottle indicates you have medication refills left, it is not necessary to call your provider’s office. Please contact your pharmacy and they will refill your medication.    If your prescription bottle indicates you do not have any refills left, you may request refills at any time through one of the following ways: The online Snaptiva system (except Urgent Care), by calling your provider’s office, or by asking your pharmacy to contact your provider’s office with a refill request. Medication refills are processed only during regular business hours and may not be available until the next business day. Your provider may request additional information or to have a follow-up visit with you prior to refilling your medication.   *Please Note: Medication refills are assigned a new Rx number when refilled electronically. Your pharmacy may indicate that no refills were authorized even though a new prescription for the same medication is available at the pharmacy. Please request the medicine by name with the pharmacy before contacting your provider for a refill.           Snaptiva Access Code: [email or web address]  Expires: 6/30/2017  3:42 PM    Snaptiva  A secure, online tool to manage your health information     [employer]’s Snaptiva® is a secure, online tool that  connects you to your personalized health information from the privacy of your home -- day or night - making it very easy for you to manage your healthcare. Once the activation process is completed, you can even access your medical information using the FortaTrust avni, which is available for free in the Apple Avni store or Google Play store.     FortaTrust provides the following levels of access (as shown below):   My Chart Features   [facility] Doctor [facility]  Specialists [facility]  Urgent  Care Non-[facility]  Primary Care  Doctor   Email your healthcare team securely and privately 24/7 X X X    Manage appointments: schedule your next appointment; view details of past/upcoming appointments X      Request prescription refills. X      View recent personal medical records, including lab and immunizations X X X X   View health record, including health history, allergies, medications X X X X   Read reports about your outpatient visits, procedures, consult and ER notes X X X X   See your discharge summary, which is a recap of your hospital and/or ER visit that includes your diagnosis, lab results, and care plan. X X       How to register for FortaTrust:  1. Go to  [email or web address].  2. Click on the Sign Up Now box, which takes you to the New Member Sign Up page. You will need to provide the following information:  a. Enter your FortaTrust Access Code exactly as it appears at the top of this page. (You will not need to use this code after you’ve completed the sign-up process. If you do not sign up before the expiration date, you must request a new code.)   b. Enter your date of birth.   c. Enter your home email address.   d. Click Submit, and follow the next screen’s instructions.  3. Create a FortaTrust ID. This will be your FortaTrust login ID and cannot be changed, so think of one that is secure and easy to remember.  4. Create a FortaTrust password. You can change your password at any time.  5. Enter your Password Reset Question  and Answer. This can be used at a later time if you forget your password.   6. Enter your e-mail address. This allows you to receive e-mail notifications when new information is available in [employer].  7. Click Sign Up. You can now view your health information.    For assistance activating your Channel[employer] account, call [phone]        Quit Tobacco Information     Do you want to quit using tobacco?    Quitting tobacco decreases risks of cancer, heart and lung disease, increases life expectancy, improves sense of taste and smell, and increases spending money, among other benefits.    If you are thinking about quitting, we can help.  • [facility] Quit Tobacco Program: [phone]  o Program occurs weekly for four weeks and includes pharmacist consultation on products to support quitting smoking or chewing tobacco. A provider referral is needed for pharmacist consultation.  • Tobacco Users Help Hotline: 6-800QUIT-NOW (406-6005) or https://[place].quitlogix.org/  o Free, confidential telephone and online coaching for [place] residents. Sessions are designed on a schedule that is convenient for you. Eligible clients receive free nicotine replacement therapy.  • Nationally: www.smokefree.gov  o Information and professional assistance to support both immediate and long-term needs as you become, and remain, a non-smoker. Smokefree.gov allows you to choose the help that best fits your needs.

## 2017-05-31 NOTE — ASSESSMENT & PLAN NOTE
REports a week ago felt ear pain and pressure. Hx of ear infections  Sore throat started 4 days ago and hurts to swallow.  Pt has had cough for 3 days. Cough is yellow thick mucus.  Smoke 1.5 ppd. Not ready to quit but we talked about importance of  Quitting this year and setting a date to quit.  Denies fever or chills.  Has taken cough and cold, Nyquil, Robitussin with little results.  States continues to work and does not want to take time off.  Has next two days off work.  Denies wheezing or SOB.

## 2017-09-21 ENCOUNTER — OFFICE VISIT (OUTPATIENT)
Dept: MEDICAL GROUP | Facility: MEDICAL CENTER | Age: 42
End: 2017-09-21
Attending: FAMILY MEDICINE
Payer: MEDICAID

## 2017-09-21 VITALS
DIASTOLIC BLOOD PRESSURE: 75 MMHG | OXYGEN SATURATION: 95 % | SYSTOLIC BLOOD PRESSURE: 120 MMHG | HEIGHT: 66 IN | TEMPERATURE: 98.3 F | BODY MASS INDEX: 29.73 KG/M2 | WEIGHT: 185 LBS | HEART RATE: 93 BPM | RESPIRATION RATE: 18 BRPM

## 2017-09-21 DIAGNOSIS — R12 HEART BURN: ICD-10-CM

## 2017-09-21 DIAGNOSIS — M25.562 CHRONIC PAIN OF LEFT KNEE: ICD-10-CM

## 2017-09-21 DIAGNOSIS — F41.9 ANXIETY: ICD-10-CM

## 2017-09-21 DIAGNOSIS — G89.29 CHRONIC PAIN OF LEFT KNEE: ICD-10-CM

## 2017-09-21 PROCEDURE — 99214 OFFICE O/P EST MOD 30 MIN: CPT | Performed by: FAMILY MEDICINE

## 2017-09-21 PROCEDURE — 99213 OFFICE O/P EST LOW 20 MIN: CPT | Performed by: FAMILY MEDICINE

## 2017-09-21 RX ORDER — RANITIDINE 300 MG/1
300 TABLET ORAL DAILY
Qty: 60 TAB | Refills: 3 | Status: SHIPPED | OUTPATIENT
Start: 2017-09-21 | End: 2020-08-24

## 2017-09-21 RX ORDER — ALPRAZOLAM 0.5 MG/1
0.5 TABLET ORAL NIGHTLY PRN
COMMUNITY
End: 2020-08-24

## 2017-09-22 NOTE — PROGRESS NOTES
Chief Complaint:   Chief Complaint   Patient presents with   • Gastrophageal Reflux       HPI:Established patient  Sadaf Nunez is a 41 y.o. female who presents for evaluation of persistent severe heartburn    Heart burn  Patient reports that her heartburn has been getting worse, she said she has been having this heartburn for years but recently for the past couple of months. Symptoms are getting worse, she has it all day long and if she eats any type of food. Denies vomiting, denies blood in stool, denies epigastric pain or discomfort. She also reports that she has been noticing difficulty in swallowing of her pills, especially at nighttime. No unintentional weight loss, reports having fever and anxiety and difficult social situation, she has been working 7 days a week without weekends for at least couple of months now to support her family, she is unable to leave the job at the job is very stressful.    Anxiety  Reports having more anxiety since she has been working very hard to support her family, working 7 days a week without weekends for at least couple of months now, her work is very stressful and she said she has been really stressed out. Patient was very emotional and crying during the encounter today. Denies depression or suicidal ideations or intentions to hurt self or others, she said her main support is her  who is with her today in the room and he tries his best to support her psychologically.     Chronic pain of left knee  Reports his chronic left knee pain, she said she had multiple falls and accidents to this left knee, recently she's been having more pain on the left knee and she sometimes feels that the knee is unstable and it giving out on her. Pain associated with feeling some knots on her kneecap. Sharp pain that is there all the time, around 6-8/10          Past medical history, family history, social history and medications reviewed and updated in the record.  Today  Current  "medications, problem list and allergies reviewed in [facility] today  Health maintenance topics are reviewed and updated.    Patient Active Problem List    Diagnosis Date Noted   • Pharyngitis 05/31/2017   • Irregular menstrual cycle 05/03/2016   • Pain in the chest 04/21/2016   • Dysfunctional uterine bleeding 04/21/2016   • Hemorrhagic ovarian cyst 02/23/2016   • History of abnormal cervical Pap smear 02/05/2016   • Insomnia 02/05/2016   • Flank pain 01/29/2016   • Anxiety 01/29/2016   • Depression 01/29/2016     Family History   Problem Relation Age of Onset   • Diabetes Mother    • Psychiatry Mother    • Cancer Maternal Grandfather      cancer stomach     Social History     Social History   • Marital status: Single     Spouse name: N/A   • Number of children: N/A   • Years of education: N/A     Occupational History   • Not on file.     Social History Main Topics   • Smoking status: Current Every Day Smoker     Packs/day: 1.50     Years: 20.00     Types: Cigarettes   • Smokeless tobacco: Never Used   • Alcohol use 0.0 oz/week      Comment: \"Maybe once a month\"   • Drug use: No   • Sexual activity: Yes     Partners: Male     Other Topics Concern   • Not on file     Social History Narrative   • No narrative on file       Current Outpatient Prescriptions   Medication Sig Dispense Refill   • alprazolam (XANAX) 0.5 MG Tab Take 0.5 mg by mouth at bedtime as needed for Sleep.     • ranitidine (ZANTAC) 300 MG tablet Take 1 Tab by mouth every day. 60 Tab 3   • trazodone (DESYREL) 100 MG Tab Take 1 Tab by mouth 1 time daily as needed for Sleep. 30 Tab 5   • guaifenesin-codeine (TUSSI-ORGANIDIN NR) 100-10 MG/5ML syrup Take 5 mL by mouth 2 times a day as needed. 120 mL 0   • fluconazole (DIFLUCAN) 150 MG tablet Take 1 Tab by mouth every day. 2 Tab 0   • benzonatate (TESSALON) 100 MG Cap Take 1 Cap by mouth 3 times a day as needed for Cough. 20 Cap 0   • albuterol 108 (90 BASE) MCG/ACT Aero Soln inhalation aerosol Inhale 2 Puffs by " "mouth every 6 hours as needed for Shortness of Breath. 8.5 g 0     No current facility-administered medications for this visit.          Review Of Systems  As documented in HPI above  PHYSICAL EXAMINATION:    /75   Pulse 93   Temp 36.8 °C (98.3 °F)   Resp 18   Ht 1.676 m (5' 6\")   Wt 83.9 kg (185 lb)   SpO2 95%   Breastfeeding? No   BMI 29.86 kg/m²   Gen.: Well-developed, well-nourished, no apparent distress, overwhelmed, and emotional and cooperative with the examination  HEENT: Normocephalic/atraumatic,   Neck: No JVD or bruits, no adenopathy  Cor: Regular rate and rhythm without murmur gallop or rub  Lungs: Clear to auscultation with equal breath sounds bilaterally. No wheezes, rhonchi.  Abdomen: Soft nontender without hepatosplenomegaly or masses appreciated, normoactive bowel sounds. Negative Marks or rebound  Extremities: No cyanosis, clubbing or edema        ASSESSMENT/Plan:  1. Heart burn  , Possibly related to her stress, I will also rule out severe gastritis, will check for H. pylori. Advised to also follow-up with gastroenterology for further assessment. Free test to rule out occult blood in stool. No red flags at this time. Advised she developed any retroflexed report. Emergency room    REFERRAL TO GASTROENTEROLOGY    OCCULT BLOOD FECES IMMUNOASSAY    CBC WITH DIFFERENTIAL    ranitidine (ZANTAC) 300 MG tablet    POCT H. Pylori    H. PYLORI AB, IGG   2. Anxiety  Patient reassured, she continues to follow-up with her psychiatrist as directed and continues to take medication as directed. I think her anxiety is related to her stressful life and discussed with the patient. Relaxation techniques. Follow-up with psychiatry as directed as soon as possible    3. Chronic pain of left knee    I will do an x-ray for further evaluation of possible osteoarthritis.  DX-KNEE 3 VIEWS LEFT       Please note that this dictation was created using voice recognition software. I have made every reasonable " attempt to correct obvious errors but there may be errors of grammar and content that I may have overlooked prior to finalization of this note.

## 2017-10-06 ENCOUNTER — HOSPITAL ENCOUNTER (OUTPATIENT)
Dept: LAB | Facility: MEDICAL CENTER | Age: 42
End: 2017-10-06
Attending: FAMILY MEDICINE
Payer: MEDICAID

## 2017-10-06 ENCOUNTER — HOSPITAL ENCOUNTER (OUTPATIENT)
Dept: RADIOLOGY | Facility: MEDICAL CENTER | Age: 42
End: 2017-10-06
Attending: FAMILY MEDICINE
Payer: MEDICAID

## 2017-10-06 DIAGNOSIS — R12 HEART BURN: ICD-10-CM

## 2017-10-06 DIAGNOSIS — G89.29 CHRONIC PAIN OF LEFT KNEE: ICD-10-CM

## 2017-10-06 DIAGNOSIS — M25.562 CHRONIC PAIN OF LEFT KNEE: ICD-10-CM

## 2017-10-06 LAB
BASOPHILS # BLD AUTO: 0.4 % (ref 0–1.8)
BASOPHILS # BLD: 0.03 K/UL (ref 0–0.12)
EOSINOPHIL # BLD AUTO: 0.13 K/UL (ref 0–0.51)
EOSINOPHIL NFR BLD: 1.5 % (ref 0–6.9)
ERYTHROCYTE [DISTWIDTH] IN BLOOD BY AUTOMATED COUNT: 47.2 FL (ref 35.9–50)
HCT VFR BLD AUTO: 41.2 % (ref 37–47)
HGB BLD-MCNC: 13.9 G/DL (ref 12–16)
IMM GRANULOCYTES # BLD AUTO: 0.02 K/UL (ref 0–0.11)
IMM GRANULOCYTES NFR BLD AUTO: 0.2 % (ref 0–0.9)
LYMPHOCYTES # BLD AUTO: 1.83 K/UL (ref 1–4.8)
LYMPHOCYTES NFR BLD: 21.6 % (ref 22–41)
MCH RBC QN AUTO: 32.3 PG (ref 27–33)
MCHC RBC AUTO-ENTMCNC: 33.7 G/DL (ref 33.6–35)
MCV RBC AUTO: 95.6 FL (ref 81.4–97.8)
MONOCYTES # BLD AUTO: 0.59 K/UL (ref 0–0.85)
MONOCYTES NFR BLD AUTO: 7 % (ref 0–13.4)
NEUTROPHILS # BLD AUTO: 5.86 K/UL (ref 2–7.15)
NEUTROPHILS NFR BLD: 69.3 % (ref 44–72)
NRBC # BLD AUTO: 0 K/UL
NRBC BLD AUTO-RTO: 0 /100 WBC
PLATELET # BLD AUTO: 194 K/UL (ref 164–446)
PMV BLD AUTO: 10.9 FL (ref 9–12.9)
RBC # BLD AUTO: 4.31 M/UL (ref 4.2–5.4)
WBC # BLD AUTO: 8.5 K/UL (ref 4.8–10.8)

## 2017-10-06 PROCEDURE — 36415 COLL VENOUS BLD VENIPUNCTURE: CPT

## 2017-10-06 PROCEDURE — 85025 COMPLETE CBC W/AUTO DIFF WBC: CPT

## 2017-10-06 PROCEDURE — 73564 X-RAY EXAM KNEE 4 OR MORE: CPT | Mod: LT

## 2017-10-10 ENCOUNTER — TELEPHONE (OUTPATIENT)
Dept: MEDICAL GROUP | Facility: MEDICAL CENTER | Age: 42
End: 2017-10-10

## 2017-10-10 ENCOUNTER — OFFICE VISIT (OUTPATIENT)
Dept: MEDICAL GROUP | Facility: MEDICAL CENTER | Age: 42
End: 2017-10-10
Attending: FAMILY MEDICINE
Payer: MEDICAID

## 2017-10-10 VITALS
BODY MASS INDEX: 29.25 KG/M2 | TEMPERATURE: 97.4 F | HEIGHT: 66 IN | HEART RATE: 77 BPM | SYSTOLIC BLOOD PRESSURE: 116 MMHG | OXYGEN SATURATION: 99 % | DIASTOLIC BLOOD PRESSURE: 60 MMHG | RESPIRATION RATE: 18 BRPM | WEIGHT: 182 LBS

## 2017-10-10 DIAGNOSIS — G89.29 CHRONIC PAIN OF LEFT KNEE: ICD-10-CM

## 2017-10-10 DIAGNOSIS — M85.862 OSTEOPENIA OF LEFT LOWER LEG: ICD-10-CM

## 2017-10-10 DIAGNOSIS — M25.562 CHRONIC PAIN OF LEFT KNEE: ICD-10-CM

## 2017-10-10 PROCEDURE — 99212 OFFICE O/P EST SF 10 MIN: CPT | Performed by: FAMILY MEDICINE

## 2017-10-10 PROCEDURE — 99213 OFFICE O/P EST LOW 20 MIN: CPT | Performed by: FAMILY MEDICINE

## 2017-10-10 NOTE — TELEPHONE ENCOUNTER
----- Message from Penny Silverio M.D. sent at 10/6/2017  2:36 PM PDT -----  Please inform patient that I reviewed her x-ray of the knee shows evidence of osteopenia which can indicate bone weakness related to low vitamin D level, no evidence of fracture or acute changes, there is evidence of arthritis. No evidence of effusion. I will advise patient to check vitamin D level, as the pain is persistent, will order an MRI for further evaluation. Patient needs to come for further evaluation of her knee pain in a close follow-up visit. Please schedule an appointment. Thank you

## 2017-10-11 NOTE — PROGRESS NOTES
Chief Complaint:   Chief Complaint   Patient presents with   • Follow-Up       HPI:Established patient , with her  today   Sadaf Nunez is a 41 y.o. female who presents for Follow-up on her left knee pain and x-ray result. Discussed the following today as follow    Chronic pain of left knee   Patient said today that the pain is better, but sometimes when she has long-standing or walking. She feels the pain on the left knee. No history of the knee locking or instability. At this time. Reviewed x-ray with the patient with shows evidence of possible mild arthritis and osteopenia of the bone     Osteopenia of left lower leg     Discussed the patient. X-ray findings of osteopenia, no recent check of her vitamin D level. She admits that she has no much of sun exposure, does not take any vitamin D supplementation. On long-term use of anti-acid for GERD symptoms.        Past medical history, family history, social history and medications reviewed and updated in the record. today  Current medications, problem list and allergies reviewed in EPIC today   Health maintenance topics are reviewed and updated.  Today     Patient Active Problem List    Diagnosis Date Noted   • Pharyngitis 05/31/2017   • Irregular menstrual cycle 05/03/2016   • Pain in the chest 04/21/2016   • Dysfunctional uterine bleeding 04/21/2016   • Hemorrhagic ovarian cyst 02/23/2016   • History of abnormal cervical Pap smear 02/05/2016   • Insomnia 02/05/2016   • Flank pain 01/29/2016   • Anxiety 01/29/2016   • Depression 01/29/2016     Family History   Problem Relation Age of Onset   • Diabetes Mother    • Psychiatry Mother    • Cancer Maternal Grandfather      cancer stomach     Social History     Social History   • Marital status: Single     Spouse name: N/A   • Number of children: N/A   • Years of education: N/A     Occupational History   • Not on file.     Social History Main Topics   • Smoking status: Current Every Day Smoker      "Packs/day: 1.50     Years: 20.00     Types: Cigarettes   • Smokeless tobacco: Never Used   • Alcohol use 0.0 oz/week      Comment: \"Maybe once a month\"   • Drug use: No   • Sexual activity: Yes     Partners: Male     Other Topics Concern   • Not on file     Social History Narrative   • No narrative on file       Current Outpatient Prescriptions   Medication Sig Dispense Refill   • alprazolam (XANAX) 0.5 MG Tab Take 0.5 mg by mouth at bedtime as needed for Sleep.     • ranitidine (ZANTAC) 300 MG tablet Take 1 Tab by mouth every day. 60 Tab 3   • guaifenesin-codeine (TUSSI-ORGANIDIN NR) 100-10 MG/5ML syrup Take 5 mL by mouth 2 times a day as needed. 120 mL 0   • fluconazole (DIFLUCAN) 150 MG tablet Take 1 Tab by mouth every day. 2 Tab 0   • benzonatate (TESSALON) 100 MG Cap Take 1 Cap by mouth 3 times a day as needed for Cough. 20 Cap 0   • albuterol 108 (90 BASE) MCG/ACT Aero Soln inhalation aerosol Inhale 2 Puffs by mouth every 6 hours as needed for Shortness of Breath. 8.5 g 0   • trazodone (DESYREL) 100 MG Tab Take 1 Tab by mouth 1 time daily as needed for Sleep. 30 Tab 5     No current facility-administered medications for this visit.          Review Of Systems  As documented in HPI above  PHYSICAL EXAMINATION:    /60   Pulse 77   Temp 36.3 °C (97.4 °F)   Resp 18   Ht 1.676 m (5' 6\")   Wt 82.6 kg (182 lb)   SpO2 99%   Breastfeeding? No   BMI 29.38 kg/m²   Gen.: Well-developed, well-nourished, no apparent distress, pleasant and cooperative with the examination  HEENT: Normocephalic/atraumatic,     Extremities: No cyanosis, clubbing or edema          ASSESSMENT/Plan:       1. Chronic pain of left knee  Discussed physical therapy, advised to come vitamin D level to prevent bone weakness, advised to increase her calcium and vitamin D intake. Advised sun exposure and to stay active. No red flags at this time.    VITAMIN 1,25 DIHYDROXY    REFERRAL TO PHYSICAL THERAPY Reason for Therapy: Eval/Treat/Report "   2. Osteopenia of left lower leg  Discussed with the patient to avoid long-term use of antiacid medication and proton pump inhibitors to prevent osteoporosis, check vitamin D level. Sent to physical therapy. Advised to stay active.    VITAMIN 1,25 DIHYDROXY    REFERRAL TO PHYSICAL THERAPY Reason for Therapy: Eval/Treat/Report       Please note that this dictation was created using voice recognition software. I have made every reasonable attempt to correct obvious errors but there may be errors of grammar and content that I may have overlooked prior to finalization of this note.

## 2018-11-01 ENCOUNTER — APPOINTMENT (OUTPATIENT)
Dept: RADIOLOGY | Facility: MEDICAL CENTER | Age: 43
End: 2018-11-01
Attending: EMERGENCY MEDICINE

## 2018-11-01 ENCOUNTER — HOSPITAL ENCOUNTER (EMERGENCY)
Facility: MEDICAL CENTER | Age: 43
End: 2018-11-01
Attending: EMERGENCY MEDICINE

## 2018-11-01 VITALS
BODY MASS INDEX: 31.21 KG/M2 | TEMPERATURE: 98.9 F | HEART RATE: 96 BPM | OXYGEN SATURATION: 97 % | RESPIRATION RATE: 24 BRPM | DIASTOLIC BLOOD PRESSURE: 90 MMHG | SYSTOLIC BLOOD PRESSURE: 135 MMHG | WEIGHT: 194.22 LBS | HEIGHT: 66 IN

## 2018-11-01 DIAGNOSIS — J45.41 MODERATE PERSISTENT REACTIVE AIRWAY DISEASE WITH ACUTE EXACERBATION: ICD-10-CM

## 2018-11-01 DIAGNOSIS — R05.9 COUGH: ICD-10-CM

## 2018-11-01 PROCEDURE — 700101 HCHG RX REV CODE 250: Performed by: EMERGENCY MEDICINE

## 2018-11-01 PROCEDURE — 700111 HCHG RX REV CODE 636 W/ 250 OVERRIDE (IP): Performed by: EMERGENCY MEDICINE

## 2018-11-01 PROCEDURE — 71045 X-RAY EXAM CHEST 1 VIEW: CPT

## 2018-11-01 PROCEDURE — 99284 EMERGENCY DEPT VISIT MOD MDM: CPT

## 2018-11-01 PROCEDURE — 94640 AIRWAY INHALATION TREATMENT: CPT

## 2018-11-01 RX ORDER — DEXAMETHASONE SODIUM PHOSPHATE 4 MG/ML
10 INJECTION, SOLUTION INTRA-ARTICULAR; INTRALESIONAL; INTRAMUSCULAR; INTRAVENOUS; SOFT TISSUE ONCE
Status: COMPLETED | OUTPATIENT
Start: 2018-11-01 | End: 2018-11-01

## 2018-11-01 RX ORDER — PSEUDOEPHEDRINE HCL 30 MG
60 TABLET ORAL EVERY 4 HOURS PRN
COMMUNITY
End: 2020-08-24

## 2018-11-01 RX ORDER — ALBUTEROL SULFATE 90 UG/1
2 AEROSOL, METERED RESPIRATORY (INHALATION) EVERY 6 HOURS PRN
Qty: 8.5 G | Refills: 0 | Status: SHIPPED | OUTPATIENT
Start: 2018-11-01 | End: 2020-08-24

## 2018-11-01 RX ORDER — AZITHROMYCIN 250 MG/1
TABLET, FILM COATED ORAL
Qty: 6 TAB | Refills: 0 | Status: SHIPPED | OUTPATIENT
Start: 2018-11-01 | End: 2020-08-24

## 2018-11-01 RX ORDER — PREDNISONE 20 MG/1
20 TABLET ORAL DAILY
Qty: 5 TAB | Refills: 0 | Status: SHIPPED | OUTPATIENT
Start: 2018-11-01 | End: 2018-11-06

## 2018-11-01 RX ORDER — GUAIFENESIN/DEXTROMETHORPHAN 100-10MG/5
5 SYRUP ORAL EVERY 6 HOURS PRN
COMMUNITY
End: 2020-08-24

## 2018-11-01 RX ADMIN — DEXAMETHASONE SODIUM PHOSPHATE 10 MG: 4 INJECTION, SOLUTION INTRA-ARTICULAR; INTRALESIONAL; INTRAMUSCULAR; INTRAVENOUS; SOFT TISSUE at 21:00

## 2018-11-01 RX ADMIN — ALBUTEROL SULFATE 2.5 MG: 2.5 SOLUTION RESPIRATORY (INHALATION) at 21:09

## 2018-11-01 ASSESSMENT — ENCOUNTER SYMPTOMS
SINUS PAIN: 0
FEVER: 1
BACK PAIN: 1
CHILLS: 1
COUGH: 1

## 2018-11-01 ASSESSMENT — PAIN SCALES - GENERAL: PAINLEVEL_OUTOF10: 6

## 2018-11-02 ASSESSMENT — ENCOUNTER SYMPTOMS: VOMITING: 0

## 2018-11-02 NOTE — ED TRIAGE NOTES
C/o fevers, n/v, nasal and chest congestion for over a month.  Smokes daily.   Denies SOB and chest pain. NAD noted, instructed to alert staff if conditions changes. Dry cough noted pt given mask.

## 2018-11-02 NOTE — ED PROVIDER NOTES
ED Provider Note    Scribed for Lorraine Marks M.D. by Mery Talley. 11/1/2018, 8:51 PM.    Primary care provider: Penny Silverio M.D.  Means of arrival: walk in  History obtained from: patient   History limited by: none    CHIEF COMPLAINT  Chief Complaint   Patient presents with   • Nasal Congestion   • N/V   • Congestion       HPI  Sadaf Nunez is a 42 y.o. female who presents to the Emergency Department for evaluation of a worsening cough for the past month. Patient reports associated congestion, sneezing, subjective fevers, chills, back pain, and rib pain. There are no exacerbating or alleviating factors identified at this time.  She does not have any sinus pain.  Patient does smoke a pack and a half of cigarettes daily.  No other acute medical complaints or concerns.     REVIEW OF SYSTEMS  Review of Systems   Constitutional: Positive for chills and fever.   HENT: Positive for congestion. Negative for sinus pain.         Sneezing    Respiratory: Positive for cough.    Gastrointestinal: Negative for vomiting.   Musculoskeletal: Positive for back pain.        Rib pain   All other systems reviewed and are negative.    PAST MEDICAL HISTORY   has a past medical history of Anxiety; Anxiety and depression; Bipolar 1 disorder (HCC); Gynecological disorder; Heart burn; Hemorrhagic disorder (HCC); History of abnormal cervical Pap smear; Indigestion; and UTI (lower urinary tract infection).    SURGICAL HISTORY   has a past surgical history that includes tonsillectomy; tubal ligation; cholecystectomy; gyn surgery; vaginal hysterectomy scope total (N/A, 5/3/2016); anterior and posterior repair (N/A, 5/3/2016); enterocele repair (N/A, 5/3/2016); vaginal suspension (N/A, 5/3/2016); and bladder sling female (N/A, 5/3/2016).    SOCIAL HISTORY  Social History   Substance Use Topics   • Smoking status: Current Every Day Smoker     Packs/day: 1.50     Years: 20.00     Types: Cigarettes   • Smokeless tobacco: Never Used  "  • Alcohol use 0.0 oz/week      Comment: \"Maybe once a month\"      History   Drug Use No       FAMILY HISTORY  Family History   Problem Relation Age of Onset   • Diabetes Mother    • Psychiatry Mother    • Cancer Maternal Grandfather         cancer stomach       CURRENT MEDICATIONS  Home Medications     Reviewed by La Nena Rebolledo R.N. (Registered Nurse) on 11/01/18 at 2047  Med List Status: Partial   Medication Last Dose Status   albuterol 108 (90 BASE) MCG/ACT Aero Soln inhalation aerosol not taking Active   alprazolam (XANAX) 0.5 MG Tab not taking Active   benzonatate (TESSALON) 100 MG Cap not taking Active   fluconazole (DIFLUCAN) 150 MG tablet not taking Active   guaiFENesin dextromethorphan (ROBITUSSIN DM) 100-10 MG/5ML Syrup syrup 11/1/2018 Active   guaifenesin-codeine (TUSSI-ORGANIDIN NR) 100-10 MG/5ML syrup not taking Active   Pseudoeph-Doxylamine-DM-APAP (NYQUIL MULTI-SYMPTOM PO) 10/31/2018 Active   pseudoephedrine (SUDAFED) 30 MG Tab 11/1/2018 Active   ranitidine (ZANTAC) 300 MG tablet 10/30/2018 Active   trazodone (DESYREL) 100 MG Tab 9/21/2017 Active                ALLERGIES  No Known Allergies    PHYSICAL EXAM  VITAL SIGNS: /90   Pulse 86   Temp 36.5 °C (97.7 °F)   Resp (!) 24   Ht 1.676 m (5' 6\")   Wt 88.1 kg (194 lb 3.6 oz)   SpO2 97%   BMI 31.35 kg/m²   Vitals reviewed by myself.  Physical Exam  Nursing note and vitals reviewed.  Constitutional: Well-developed and well-nourished. No acute distress.   HENT: Head is normocephalic and atraumatic.  Eyes: extra-ocular movements intact  Cardiovascular: Regular rate and regular rhythm. No murmur heard.  Pulmonary/Chest: Breath sounds normal. No wheezes or rales.   Abdominal: Soft and non-tender. No distention.    Musculoskeletal: Extremities exhibit normal range of motion without edema or tenderness.   Neurological: Awake and alert  Skin: Skin is warm and dry. No rash.     DIAGNOSTIC STUDIES /    RADIOLOGY  DX-CHEST-PORTABLE (1 VIEW) "   Final Result         1.  No acute cardiopulmonary disease.        The radiologist's interpretation of all radiological studies have been reviewed by me.    REASSESSMENT    8:51 PM- Patient was examined at bedside. Informed her she will be treated with Decadron 10 mg and Proventil 2.5 mg for treatment and I will order imaging to evaluate. Patient understands.     9:54 PM- Reviewed the patient's imaging results.     10:11 PM- Patient rechecked at bedside. She is feeling much improved secondary to receiving medications. The patient was updated on diagnostic test results as seen above. The patient will be discharged, status improved, with instructions regarding supportive care and with a prescription for Albuterol, Zithromax, and Deltasone. Instructions were given for follow-up. Discussed indications for seeking immediate medical attention. Patient was given the opportunity for questions. The patient understands and agrees.     COURSE & MEDICAL DECISION MAKING  Nursing notes, VS, PMSFHx reviewed in chart.    Patient is a 42-year-old female who comes in for cough, subjective fevers, and congestion.  Differential diagnosis includes upper respiratory infection, viral syndrome, reactive airway disease, COPD, reactive airway disease exacerbation, pneumonia.  Diagnostic workup includes chest x-ray.    On initial exam patient is well-appearing with slight tachycardia.  She is treated with dexamethasone and albuterol.  After treatment patient feels greatly improved.  Chest x-ray returns demonstrates no acute abnormalities.  As patient is a smoker I have advised on smoking cessation, however I will treat her with outpatient azithromycin for possible reactive airway flareup.  I will also treated with steroids and albuterol.  Patient is agreeable to this plan.  She is then given strict return precautions and discharged home in stable condition.      The patient will return for new or worsening symptoms and is stable at the time  of discharge.    DISPOSITION:  Patient will be discharged home in stable condition.    FOLLOW UP:  Penny Silverio M.D.  [address] [id number] [phone]          OUTPATIENT MEDICATIONS:  New Prescriptions    ALBUTEROL 108 (90 BASE) MCG/ACT AERO SOLN INHALATION AEROSOL    Inhale 2 Puffs by mouth every 6 hours as needed for Shortness of Breath.    AZITHROMYCIN (ZITHROMAX) 250 MG TAB    Take two tabs by mouth on day one, then one tab by mouth daily on days 2-5.    PREDNISONE (DELTASONE) 20 MG TAB    Take 1 Tab by mouth every day for 5 days.     FINAL IMPRESSION  1. Cough    2. Moderate persistent reactive airway disease with acute exacerbation        Mery KNOX (Jeanaibda), am scribing for, and in the presence of, Lorraine Marks M.D..    Electronically signed by: Mery Talley (Blue), 11/1/2018    Lorraine KNOX M.D. personally performed the services described in this documentation, as scribed by Mery Talley in my presence, and it is both accurate and complete. C.     The note accurately reflects work and decisions made by me.  Lorraine Marks  11/2/2018  2:40 AM

## 2018-11-02 NOTE — ED NOTES
Ambulated to room.  Agree with triage assessment.  Changing into gown.  Chart placed for ERP eval.

## 2018-11-02 NOTE — ED NOTES
All lines and monitors DC'd.  Discharge instructions given, questions answered.  Ambulated out of ER, escorted by RN.  Pt states all belongings in possession.  Instructed not to drive after pain medication and pt verbalizes understanding.  RX x2 given.

## 2020-08-24 ENCOUNTER — OFFICE VISIT (OUTPATIENT)
Dept: MEDICAL GROUP | Facility: PHYSICIAN GROUP | Age: 45
End: 2020-08-24
Payer: COMMERCIAL

## 2020-08-24 VITALS
WEIGHT: 207.2 LBS | RESPIRATION RATE: 20 BRPM | HEIGHT: 66 IN | OXYGEN SATURATION: 97 % | SYSTOLIC BLOOD PRESSURE: 104 MMHG | BODY MASS INDEX: 33.3 KG/M2 | HEART RATE: 90 BPM | DIASTOLIC BLOOD PRESSURE: 80 MMHG | TEMPERATURE: 98.4 F

## 2020-08-24 DIAGNOSIS — F51.05 INSOMNIA DUE TO OTHER MENTAL DISORDER: ICD-10-CM

## 2020-08-24 DIAGNOSIS — F33.1 MODERATE EPISODE OF RECURRENT MAJOR DEPRESSIVE DISORDER (HCC): ICD-10-CM

## 2020-08-24 DIAGNOSIS — F99 INSOMNIA DUE TO OTHER MENTAL DISORDER: ICD-10-CM

## 2020-08-24 DIAGNOSIS — F41.9 ANXIETY: Primary | ICD-10-CM

## 2020-08-24 DIAGNOSIS — E66.9 OBESITY (BMI 30-39.9): ICD-10-CM

## 2020-08-24 PROBLEM — J02.9 PHARYNGITIS: Status: RESOLVED | Noted: 2017-05-31 | Resolved: 2020-08-24

## 2020-08-24 PROBLEM — F17.200 NICOTINE DEPENDENCE: Status: ACTIVE | Noted: 2020-08-24

## 2020-08-24 PROCEDURE — 99204 OFFICE O/P NEW MOD 45 MIN: CPT | Performed by: FAMILY MEDICINE

## 2020-08-24 RX ORDER — PROPRANOLOL HYDROCHLORIDE 20 MG/1
20 TABLET ORAL 3 TIMES DAILY
Qty: 90 TAB | Refills: 0 | Status: SHIPPED | OUTPATIENT
Start: 2020-08-24 | End: 2020-09-21

## 2020-08-24 RX ORDER — CITALOPRAM 20 MG/1
20 TABLET ORAL DAILY
Qty: 30 TAB | Refills: 0 | Status: SHIPPED | OUTPATIENT
Start: 2020-08-24 | End: 2020-09-21

## 2020-08-24 SDOH — HEALTH STABILITY: MENTAL HEALTH: HOW OFTEN DO YOU HAVE A DRINK CONTAINING ALCOHOL?: MONTHLY OR LESS

## 2020-08-24 SDOH — HEALTH STABILITY: MENTAL HEALTH: HOW OFTEN DO YOU HAVE 6 OR MORE DRINKS ON ONE OCCASION?: NEVER

## 2020-08-24 SDOH — HEALTH STABILITY: MENTAL HEALTH: HOW MANY STANDARD DRINKS CONTAINING ALCOHOL DO YOU HAVE ON A TYPICAL DAY?: 1 OR 2

## 2020-08-24 ASSESSMENT — ANXIETY QUESTIONNAIRES
6. BECOMING EASILY ANNOYED OR IRRITABLE: NEARLY EVERY DAY
3. WORRYING TOO MUCH ABOUT DIFFERENT THINGS: MORE THAN HALF THE DAYS
5. BEING SO RESTLESS THAT IT IS HARD TO SIT STILL: MORE THAN HALF THE DAYS
2. NOT BEING ABLE TO STOP OR CONTROL WORRYING: MORE THAN HALF THE DAYS
7. FEELING AFRAID AS IF SOMETHING AWFUL MIGHT HAPPEN: NEARLY EVERY DAY
4. TROUBLE RELAXING: NEARLY EVERY DAY
1. FEELING NERVOUS, ANXIOUS, OR ON EDGE: NEARLY EVERY DAY
GAD7 TOTAL SCORE: 18

## 2020-08-24 ASSESSMENT — PATIENT HEALTH QUESTIONNAIRE - PHQ9
5. POOR APPETITE OR OVEREATING: 2 - MORE THAN HALF THE DAYS
SUM OF ALL RESPONSES TO PHQ QUESTIONS 1-9: 14
CLINICAL INTERPRETATION OF PHQ2 SCORE: 2

## 2020-08-24 NOTE — ASSESSMENT & PLAN NOTE
This is a chronic condition. She is having much difficulty falling and staying asleep. It has gotten worse as the anxiety has worsened. She has been taking diphenhydramine, which only helps shortterm. Sometimes she has to take  mg of the benadryl to fall asleep.

## 2020-08-24 NOTE — ASSESSMENT & PLAN NOTE
This is a chronic condition.  Onset: years  PHQ screen: 14/27  Suicidal ideation/homicidal ideation: no/no  Therapy/counseling: not currently - not interested in seeing therapy  Medications: none  Improving/worsening: worsening, but not as bad as anxiety

## 2020-08-24 NOTE — PROGRESS NOTES
Subjective:     CC:  Diagnoses of Anxiety, Moderate episode of recurrent major depressive disorder (HCC), Insomnia due to other mental disorder, and Obesity (BMI 30-39.9) were pertinent to this visit.    HISTORY OF THE PRESENT ILLNESS: Patient is a 44 y.o. female. This pleasant patient is here today to establish care and discuss anxiety. Her prior PCP was Penny Silverio MD.    Anxiety  This is a chronic condition. She notes it has been getting very bad. She will get anxious and then scratch her arms until they bleed. Sometimes she will dig at her head until it bleeds. She will also get muscle spasms, especially in her back, with all the tension. 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 She has been on xanax, clonipin, and others. She has been on fluoxetine in the past but couldn't tolerate. She has been on seroquel in the past. She had seen psychiatry in the past.    Depression  This is a chronic condition.  Onset: years  PHQ screen: 14/27  Suicidal ideation/homicidal ideation: no/no  Therapy/counseling: not currently - not interested in seeing therapy  Medications: none  Improving/worsening: worsening, but not as bad as anxiety      Insomnia  This is a chronic condition. She is having much difficulty falling and staying asleep. It has gotten worse as the anxiety has worsened. She has been taking diphenhydramine, which only helps shortterm. Sometimes she has to take  mg of the benadryl to fall asleep.     Depression Screening    Little interest or pleasure in doing things?  1 - several days   Feeling down, depressed , or hopeless? 1 - several days   Trouble falling or staying asleep, or sleeping too much?  3 - nearly every day   Feeling tired or having little energy?  2 - more than half the days   Poor appetite or overeating?  2 - more than half the days   Feeling bad about yourself - or that you are a failure or have let yourself or your family down? 2 - more than half the days  "  Trouble concentrating on things, such as reading the newspaper or watching television? 1 - several days   Moving or speaking so slowly that other people could have noticed.  Or the opposite - being so fidgety or restless that you have been moving around a lot more than usual?  2 - more than half the days   Thoughts that you would be better off dead, or of hurting yourself?  0 - not at all   Patient Health Questionnaire Score: 14       If depressive symptoms identified deferred to follow up visit unless specifically addressed in assesment and plan.    Interpretation of PHQ-9 Total Score   Score Severity   1-4 No Depression   5-9 Mild Depression   10-14 Moderate Depression   15-19 Moderately Severe Depression   20-27 Severe Depression    ALEC-7 Questionnaire    Feeling nervous, anxious, or on edge: Nearly every day  Not being able to sop or control worrying: More than half the days  Worrying too much about different things: More than half the days  Trouble relaxing: Nearly every day  Being so restless that it's hard to sit still: More than half the days  Becoming easily annoyed or irritable: Nearly every day  Feeling afraid as if something awful might happen: Nearly every day  Total: 18    Interpretation of ALEC 7 Total Score   Score Severity :  0-4 No Anxiety   5-9 Mild Anxiety  10-14 Moderate Anxiety  15-21 Severe Anxiety        Allergies: Patient has no known allergies.    Current Outpatient Medications Ordered in Epic   Medication Sig Dispense Refill   • citalopram (CELEXA) 20 MG Tab Take 1 Tab by mouth every day. 30 Tab 0   • propranolol (INDERAL) 20 MG Tab Take 1 Tab by mouth 3 times a day. 90 Tab 0     No current Epic-ordered facility-administered medications on file.        Past Medical History:   Diagnosis Date   • Anxiety    • Anxiety and depression    • Bipolar 1 disorder (HCC)    • Gynecological disorder     \"Vaginal bleeding\"   • Heart burn    • Hemorrhagic disorder (HCC)    • History of abnormal cervical " Pap smear    • Indigestion    • UTI (lower urinary tract infection)     Pt. Denies at this time 4/22/16       Past Surgical History:   Procedure Laterality Date   • ANTERIOR AND POSTERIOR REPAIR N/A 5/3/2016    Procedure: ANTERIOR AND POSTERIOR REPAIR;  Surgeon: Devon Gates M.D.;  Location: SURGERY SAME DAY [facility];  Service:    • ENTEROCELE REPAIR N/A 5/3/2016    Procedure: ENTEROCELE REPAIR, PERINEOPLASTY;  Surgeon: Devon Gates M.D.;  Location: SURGERY SAME DAY [facility];  Service:    • VAGINAL SUSPENSION N/A 5/3/2016    Procedure: VAGINAL SUSPENSION SACROSPINOUS VAULT;  Surgeon: Devon Gates M.D.;  Location: SURGERY SAME DAY [facility];  Service:    • BLADDER SLING FEMALE N/A 5/3/2016    Procedure: BLADDER SLING FEMALE TOT;  Surgeon: Devon Gates M.D.;  Location: SURGERY SAME DAY [facility];  Service:    • VAGINAL HYSTERECTOMY SCOPE TOTAL N/A 5/3/2016    Procedure: VAGINAL HYSTERECTOMY SCOPE TOTAL WITH MELODY SALPINGECTOMY;  Surgeon: Devon Gates M.D.;  Location: SURGERY SAME DAY [facility];  Service:    • GYN SURGERY  07/1995    Labia surgery   • CHOLECYSTECTOMY     • TONSILLECTOMY     • TUBAL COAGULATION LAPAROSCOPIC BILATERAL         Social History     Tobacco Use   • Smoking status: Current Every Day Smoker     Packs/day: 1.50     Years: 20.00     Pack years: 30.00     Types: Cigarettes   • Smokeless tobacco: Never Used   Substance Use Topics   • Alcohol use: Yes     Alcohol/week: 0.0 oz     Frequency: Monthly or less     Drinks per session: 1 or 2     Binge frequency: Never     Comment: Less than once a month    • Drug use: No       Social History     Social History Narrative   • Not on file       Family History   Problem Relation Age of Onset   • Diabetes Mother    • Psychiatric Illness Mother    • Alcohol abuse Father    • Psychiatric Illness Father    • Cancer Maternal Grandfather         cancer stomach   • Stroke Maternal Grandmother    • Heart Disease Neg Hx   "      Health Maintenance: deferred until mental health improved    ROS:   Gen: no fevers/chills  Eyes: no changes in vision  ENT: no sore throat  Pulm: no sob  CV: no chest pain  GI: no nausea/vomiting  : no dysuria  MSk: no myalgias  Skin: no rash  Neuro: + headaches - tension, frequent     Objective:     Exam: /80 (BP Location: Left arm, Patient Position: Sitting, BP Cuff Size: Adult)   Pulse 90   Temp 36.9 °C (98.4 °F) (Temporal)   Resp 20   Ht 1.676 m (5' 6\")   Wt 94 kg (207 lb 3.2 oz)   SpO2 97%  Body mass index is 33.44 kg/m².    General: Normal appearing. No distress.  HEENT: Normocephalic. Eyes conjunctiva clear lids without ptosis, pupils equal and reactive to light accommodation, ears normal shape and contour, canals are clear bilaterally, tympanic membranes are benign, oropharynx is without erythema, edema or exudates.   Neck: Supple without JVD. Thyroid is not enlarged.  Pulmonary: Clear to ausculation.  Normal effort. No rales, ronchi, or wheezing.  Cardiovascular: Regular rate and rhythm without murmur. Carotid and radial pulses are intact and equal bilaterally.  Abdomen: Soft, nontender, nondistended. Normal bowel sounds. Liver and spleen are not palpable  Neurologic: Grossly nonfocal  Lymph: No cervical or supraclavicular lymph nodes are palpable  Skin: Warm and dry.  No obvious lesions.  Musculoskeletal: Normal gait. No extremity cyanosis, clubbing, or edema.  Psych: Normal mood and affect. Alert and oriented x3. Judgment and insight is normal.    Assessment & Plan:   44 y.o. female with the following -    1. Anxiety  2. Moderate episode of recurrent major depressive disorder (HCC)  These are chronic conditions, uncontrolled.  She has had anxiety for several years but has been worsening recently.  Her jeevan 7 score indicates severe anxiety.  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  Her depression score today indicates moderate depression.  She " "denies any suicidal or homicidal ideation today.  She has been on multiple benzodiazepines in the past and has tried fluoxetine, sertraline, Zyprexa, Effexor, olanzapine, and Seroquel in the past.  Fluoxetine she could not tolerate, sertraline, Zyprexa, Effexor, olanzapine all did not work.  Seroquel helped but it was such a high dose she felt like a \"zombie\".  -Start citalopram 20 mg daily  -Start propranolol 20 mg up to 3 times a day as needed for anxiety attacks  - Patient has been identified as having a positive depression screening. Appropriate orders and counseling have been given.  -If citalopram does not provide relief, could consider Cymbalta    3. Insomnia due to other mental disorder  This is a chronic condition, worsening.  As her anxiety is been worsening her insomnia is worsening.  She is having trouble falling and staying asleep.  She has been trying to use over-the-counter diphenhydramine but some nights takes upwards of 75 to 125 mg to help her sleep.  We discussed that this is a very high dose that she should limit to 50 mg.  I did offer hydroxyzine but she states that she been on that in the past to help with her incessant scratching related to the anxiety and it did not help with her sleep at all.  Therefore, we are going to monitor for now and hope that as her anxiety improves her sleep improves.  -If no change in sleep at next visit, could consider short-term benzodiazepine versus Ambien    4. Obesity (BMI 30-39.9)  This is a chronic condition.  BMI today does show obesity.  Due to her significant depression anxiety, we will work on preventive care after this is been addressed so we will address obesity at a future appointment.  - Patient identified as having weight management issue.  Appropriate orders and counseling given.      Return in about 4 weeks (around 9/21/2020) for f/u depression, anxiety.    Please note that this dictation was created using voice recognition software. I have made " every reasonable attempt to correct obvious errors, but I expect that there are errors of grammar and possibly content that I did not discover before finalizing the note.

## 2020-09-21 ENCOUNTER — OFFICE VISIT (OUTPATIENT)
Dept: MEDICAL GROUP | Facility: PHYSICIAN GROUP | Age: 45
End: 2020-09-21
Payer: COMMERCIAL

## 2020-09-21 VITALS
SYSTOLIC BLOOD PRESSURE: 98 MMHG | HEART RATE: 82 BPM | HEIGHT: 66 IN | TEMPERATURE: 98.6 F | BODY MASS INDEX: 33.91 KG/M2 | RESPIRATION RATE: 16 BRPM | DIASTOLIC BLOOD PRESSURE: 68 MMHG | OXYGEN SATURATION: 97 % | WEIGHT: 211 LBS

## 2020-09-21 DIAGNOSIS — F41.9 ANXIETY: Primary | ICD-10-CM

## 2020-09-21 DIAGNOSIS — M54.41 ACUTE MIDLINE LOW BACK PAIN WITH RIGHT-SIDED SCIATICA: ICD-10-CM

## 2020-09-21 DIAGNOSIS — R12 HEARTBURN: ICD-10-CM

## 2020-09-21 DIAGNOSIS — F33.1 MODERATE EPISODE OF RECURRENT MAJOR DEPRESSIVE DISORDER (HCC): ICD-10-CM

## 2020-09-21 PROBLEM — M54.9 BACK PAIN: Status: ACTIVE | Noted: 2020-09-21

## 2020-09-21 PROCEDURE — 99214 OFFICE O/P EST MOD 30 MIN: CPT | Performed by: FAMILY MEDICINE

## 2020-09-21 RX ORDER — TIZANIDINE 4 MG/1
4 TABLET ORAL EVERY 6 HOURS PRN
Qty: 30 TAB | Refills: 3 | Status: SHIPPED | OUTPATIENT
Start: 2020-09-21 | End: 2020-12-22 | Stop reason: SDUPTHER

## 2020-09-21 RX ORDER — DULOXETIN HYDROCHLORIDE 20 MG/1
20 CAPSULE, DELAYED RELEASE ORAL DAILY
Qty: 30 CAP | Refills: 0 | Status: SHIPPED | OUTPATIENT
Start: 2020-09-21 | End: 2020-10-19

## 2020-09-21 RX ORDER — FAMOTIDINE 20 MG/1
20 TABLET, FILM COATED ORAL 2 TIMES DAILY
Qty: 180 TAB | Refills: 0 | Status: SHIPPED | OUTPATIENT
Start: 2020-09-21 | End: 2021-05-20

## 2020-09-21 NOTE — ASSESSMENT & PLAN NOTE
This is a chronic condition.  Onset: years  PHQ screen: 14/27 (8/2020)  Suicidal ideation/homicidal ideation:  Therapy/counseling: not currently - not interested in seeing therapy  Medications: citalopram 20 mg daily  Side effects: diarrhea  Improving/worsening: ~50% better

## 2020-09-21 NOTE — ASSESSMENT & PLAN NOTE
This is a new condition.  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

## 2020-09-21 NOTE — ASSESSMENT & PLAN NOTE
This is a chronic condition.  She has a history of severe anxiety.  At her last appointment I did prescribe some medication help with this.  Current meds: Citalopram 20 mg daily, propanolol 20 mg 3 times daily as needed anxiety/panic attacks  Side effects: diarrhea daily  Improvement: ~25% better

## 2020-09-21 NOTE — PROGRESS NOTES
"Subjective:     CC: f/u anxiety/depression    HPI:   Sadaf presents today with     Anxiety  This is a chronic condition.  She has a history of severe anxiety.  At her last appointment I did prescribe some medication help with this.  Current meds: Citalopram 20 mg daily, propanolol 20 mg 3 times daily as needed anxiety/panic attacks  Side effects: diarrhea daily  Improvement: ~25% better    Depression  This is a chronic condition.  Onset: years  PHQ screen: 14/27 (8/2020)  Suicidal ideation/homicidal ideation:  Therapy/counseling: not currently - not interested in seeing therapy  Medications: citalopram 20 mg daily  Side effects: diarrhea  Improving/worsening: ~50% better      Heartburn  This is a chronic condition. She states she gets really bad heartburn. She was on omeprazole for \"a long time\" but stopped when she ran out of insurance. Currently she is using tums a lot to help manage the conditions.     Back pain  This is a new condition.  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 Denies at this time 4/22/16       Social History     Tobacco Use   • Smoking status: Current Every Day Smoker     Packs/day: 1.50     Years: 20.00     Pack years: 30.00     Types: " "Cigarettes   • Smokeless tobacco: Never Used   Substance Use Topics   • Alcohol use: Yes     Alcohol/week: 0.0 oz     Frequency: Monthly or less     Drinks per session: 1 or 2     Binge frequency: Never     Comment: Less than once a month    • Drug use: No       Current Outpatient Medications Ordered in Epic   Medication Sig Dispense Refill   • DULoxetine (CYMBALTA) 20 MG Cap DR Particles Take 1 Cap by mouth every day. 30 Cap 0   • famotidine (PEPCID) 20 MG Tab Take 1 Tab by mouth 2 times a day. 180 Tab 0   • tizanidine (ZANAFLEX) 4 MG Tab Take 1 Tab by mouth every 6 hours as needed. 30 Tab 3     No current Epic-ordered facility-administered medications on file.        Allergies:  Patient has no known allergies.    Health Maintenance: Completed    ROS:  Gen: no fevers/chills  Pulm: no sob  CV: no chest pain    Objective:     Exam:  BP (!) 98/68 (BP Location: Left arm, Patient Position: Sitting, BP Cuff Size: Adult)   Pulse 82   Temp 37 °C (98.6 °F)   Resp 16   Ht 1.676 m (5' 6\")   Wt 95.7 kg (211 lb)   SpO2 97%   BMI 34.06 kg/m²  Body mass index is 34.06 kg/m².    Gen: Alert and oriented, No apparent distress.  Neck: Neck is supple without lymphadenopathy.  Lungs: Normal effort, CTA bilaterally, no wheezes, rhonchi, or rales  CV: Regular rate and rhythm. No murmurs, rubs, or gallops.  Ext: No clubbing, cyanosis, edema.  Back:  Full ROM, 5/5 LE strength, sensation intact bilaterally in LE, no TTP over spinous processes, paraspinals tender in bilateral lumbar region, SI joint positive bilaterally, straight leg raise positive on right, Tiffani test positive on right    Assessment & Plan:     44 y.o. female with the following -     1. Anxiety  This is a chronic condition, slightly improved.  Since starting citalopram she notes a 25% improvement in her anxiety.  However, she is been getting diarrhea daily and is starting to become problematic after discussion run to try different medication.  -Stop " citalopram  -Start duloxetine 20 mg daily    2. Moderate episode of recurrent major depressive disorder (HCC)  This is a chronic condition, improved.  She notes approximately 50% improvement with her depression since starting citalopram.  However, as discussed above her change in medication due to side effects.  -Stop citalopram  -Start duloxetine 20 mg daily    3. Heartburn  This is a chronic condition, uncontrolled.  She has fear she is had really bad heartburn.  She was on omeprazole for years but stopped when she ran out of insurance.  She states that she been off omeprazole she has been using Tums very frequently to control her symptoms.  Since she may need long-term medication use we will try an H2 blocker before PPIs long-term PPI use can lead to osteoporosis and kidney disease.  -Start ranitidine 20 mg twice daily    4. Acute midline low back pain with right-sided sciatica  This is a new condition.  For last month she is been getting spasming back pain in her lumbar region in the midline and bilaterally.  She does a lot of heavy lifting at work and admits that she lives with her back and not with her legs.  No red flag symptoms.  Exam showed paraspinal spasms with SI joint tenderness, sciatica, and Tiffani test was positive on the right.  - REFERRAL TO PHYSICAL THERAPY Reason for Therapy: Eval/Treat/Report  -Tizanidine as needed for muscle spasms    Return in about 4 weeks (around 10/19/2020) for f/u anxiety, depression, acid reflux.    Please note that this dictation was created using voice recognition software. I have made every reasonable attempt to correct obvious errors, but I expect that there are errors of grammar and possibly content that I did not discover before finalizing the note.

## 2020-09-21 NOTE — ASSESSMENT & PLAN NOTE
"This is a chronic condition. She states she gets really bad heartburn. She was on omeprazole for \"a long time\" but stopped when she ran out of insurance. Currently she is using tums a lot to help manage the conditions.   "

## 2020-10-19 ENCOUNTER — APPOINTMENT (OUTPATIENT)
Dept: MEDICAL GROUP | Facility: PHYSICIAN GROUP | Age: 45
End: 2020-10-19
Payer: COMMERCIAL

## 2020-10-19 RX ORDER — DULOXETIN HYDROCHLORIDE 20 MG/1
CAPSULE, DELAYED RELEASE ORAL
Qty: 30 CAP | Refills: 0 | Status: SHIPPED | OUTPATIENT
Start: 2020-10-19 | End: 2020-11-16

## 2020-10-20 ENCOUNTER — NURSE TRIAGE (OUTPATIENT)
Dept: HEALTH INFORMATION MANAGEMENT | Facility: OTHER | Age: 45
End: 2020-10-20

## 2020-10-20 NOTE — TELEPHONE ENCOUNTER
Regarding: COVID CLEAR  ----- Message from Amanda Merlino sent at 10/20/2020  8:21 AM PDT -----  FEVER/ STOMACH / DIARRHEA -

## 2020-10-20 NOTE — TELEPHONE ENCOUNTER
"  Reason for Disposition  • Bloody, black, or tarry bowel movements    Additional Information  • Negative: Passed out (i.e., fainted, collapsed and was not responding)  • Negative: Shock suspected (e.g., cold/pale/clammy skin, too weak to stand, low BP, rapid pulse)  • Negative: Sounds like a life-threatening emergency to the triager  • Negative: Chest pain  • Negative: Pain is mainly in upper abdomen (if needed ask: 'is it mainly above the belly button?')  • Negative: Abdominal pain and pregnant > 20 weeks  • Negative: Abdominal pain and pregnant < 20 weeks    Answer Assessment - Initial Assessment Questions  1. LOCATION: \"Where does it hurt?\"       Lower abd  2. RADIATION: \"Does the pain shoot anywhere else?\" (e.g., chest, back)      no  3. ONSET: \"When did the pain begin?\" (e.g., minutes, hours or days ago)       2 days  4. SUDDEN: \"Gradual or sudden onset?\"      sudden  5. PATTERN \"Does the pain come and go, or is it constant?\"     - If constant: \"Is it getting better, staying the same, or worsening?\"       (Note: Constant means the pain never goes away completely; most serious pain is constant and it progresses)      - If intermittent: \"How long does it last?\" \"Do you have pain now?\"      (Note: Intermittent means the pain goes away completely between bouts)      Comes and goes  6. SEVERITY: \"How bad is the pain?\"  (e.g., Scale 1-10; mild, moderate, or severe)    - MILD (1-3): doesn't interfere with normal activities, abdomen soft and not tender to touch     - MODERATE (4-7): interferes with normal activities or awakens from sleep, tender to touch     - SEVERE (8-10): excruciating pain, doubled over, unable to do any normal activities       8  7. RECURRENT SYMPTOM: \"Have you ever had this type of abdominal pain before?\" If so, ask: \"When was the last time?\" and \"What happened that time?\"       simular to food poisoning  8. CAUSE: \"What do you think is causing the abdominal pain?\"      food  9. " "RELIEVING/AGGRAVATING FACTORS: \"What makes it better or worse?\" (e.g., movement, antacids, bowel movement)      no  10. OTHER SYMPTOMS: \"Has there been any vomiting, diarrhea, constipation, or urine problems?\"        diarrhea  11. PREGNANCY: \"Is there any chance you are pregnant?\" \"When was your last menstrual period?\"        no    Protocols used: ABDOMINAL PAIN - FEMALE-A-OH    "

## 2020-10-26 ENCOUNTER — PHYSICAL THERAPY (OUTPATIENT)
Dept: PHYSICAL THERAPY | Facility: REHABILITATION | Age: 45
End: 2020-10-26
Attending: FAMILY MEDICINE
Payer: COMMERCIAL

## 2020-10-26 DIAGNOSIS — M54.41 ACUTE MIDLINE LOW BACK PAIN WITH RIGHT-SIDED SCIATICA: ICD-10-CM

## 2020-10-26 PROCEDURE — 97110 THERAPEUTIC EXERCISES: CPT

## 2020-10-26 PROCEDURE — 97161 PT EVAL LOW COMPLEX 20 MIN: CPT

## 2020-10-26 PROCEDURE — 97014 ELECTRIC STIMULATION THERAPY: CPT

## 2020-10-26 ASSESSMENT — ENCOUNTER SYMPTOMS
QUALITY: SHOOTING
PAIN LOCATION: R LATERAL THIGH
QUALITY: BURNING
PAIN TIMING: CONSTANT
PAIN SCALE: 7
PAIN SCALE AT LOWEST: 7
QUALITY: SHARP
PAIN SCALE AT HIGHEST: 10
ALLEVIATING FACTORS: MOVEMENT

## 2020-10-26 NOTE — OP THERAPY EVALUATION
Outpatient Physical Therapy  INITIAL EVALUATION    [facility]  [address]  [facility] [address]  Phone:  [phone]  Fax:  [phone]    Date of Evaluation: 10/26/2020    Patient: Sadaf Nunez  YOB: 1975  MRN: [id number]     Referring Provider: [address]  [place] [address]   Referring Diagnosis Acute midline low back pain with right-sided sciatica [M54.41]     Time Calculation    Start time: 1300  Stop time: 1400 Time Calculation (min): 60 minutes         Chief Complaint: Back Problem    Visit Diagnoses     ICD-10-CM   1. Acute midline low back pain with right-sided sciatica  M54.41         Subjective:   History of Present Illness:     Mechanism of injury:  Pt reports central LBP x2 months with more recent burning pain at outside of R thigh. Pt denies pain below R knee. She reprots her pain is constant and increased with standing still, lifting and prolonged sitting. She reports improvement in pain with movement, walking. 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 Pt denies numbness/tingling in B LE, denies change in bowel/bladder, denies changes in pain with Valsalva.   Prior level of function:  Pt unloads semi-trucks with heavy lifting, Works alternating shifts: 2 swing shifts, in-between, early morning. 8 hour days.   Sleep disturbance:  Non-restful sleep (Requires use of muscle relaxors)  Pain:     Current pain ratin    At best pain ratin    At worst pain rating:  10    Location:  R lateral thigh    Quality:  Burning, shooting and sharp    Pain timing:  Constant    Relieving factors:  Movement  Social Support:     Lives in:  Multiple-level home  Diagnostic Tests:     None    Patient Goals:     Patient goals for therapy:  Decreased pain    Other patient goals:  Stand without pain      Past Medical History:   Diagnosis Date   • Anxiety    •  "Anxiety and depression    • Bipolar 1 disorder (HCC)    • Gynecological disorder     \"Vaginal bleeding\"   • Heart burn    • Hemorrhagic disorder (HCC)    • History of abnormal cervical Pap smear    • Indigestion    • UTI (lower urinary tract infection)     Pt. Denies at this time 4/22/16     Past Surgical History:   Procedure Laterality Date   • ANTERIOR AND POSTERIOR REPAIR N/A 5/3/2016    Procedure: ANTERIOR AND POSTERIOR REPAIR;  Surgeon: Devon Gates M.D.;  Location: SURGERY SAME DAY [facility];  Service:    • ENTEROCELE REPAIR N/A 5/3/2016    Procedure: ENTEROCELE REPAIR, PERINEOPLASTY;  Surgeon: Devon Gates M.D.;  Location: SURGERY SAME DAY [facility];  Service:    • VAGINAL SUSPENSION N/A 5/3/2016    Procedure: VAGINAL SUSPENSION SACROSPINOUS VAULT;  Surgeon: Devon Gaets M.D.;  Location: SURGERY SAME DAY [facility];  Service:    • BLADDER SLING FEMALE N/A 5/3/2016    Procedure: BLADDER SLING FEMALE TOT;  Surgeon: Devon Gates M.D.;  Location: SURGERY SAME DAY [facility];  Service:    • VAGINAL HYSTERECTOMY SCOPE TOTAL N/A 5/3/2016    Procedure: VAGINAL HYSTERECTOMY SCOPE TOTAL WITH MELODY SALPINGECTOMY;  Surgeon: Devon Gates M.D.;  Location: SURGERY SAME DAY [facility];  Service:    • GYN SURGERY  07/1995    Labia surgery   • CHOLECYSTECTOMY     • TONSILLECTOMY     • TUBAL COAGULATION LAPAROSCOPIC BILATERAL       Social History     Tobacco Use   • Smoking status: Current Every Day Smoker     Packs/day: 1.50     Years: 20.00     Pack years: 30.00     Types: Cigarettes   • Smokeless tobacco: Never Used   Substance Use Topics   • Alcohol use: Yes     Alcohol/week: 0.0 oz     Frequency: Monthly or less     Drinks per session: 1 or 2     Binge frequency: Never     Comment: Less than once a month      Family and Occupational History     Socioeconomic History   • Marital status: Single     Spouse name: Not on file   • Number of children: Not on file   • Years of education: Not on file "   • Highest education level: Not on file   Occupational History   • Not on file       Objective     Observations     Sacral position: L/R sacral extension    Additional Observation Details  Pt with weight shift to right- increase in pain with shift correction    Postural Observations  Seated posture: poor  Correction of posture: makes symptoms worse        Hip Screen   Hip range of motion within functional limits with the following exceptions: R hip flexion to approx 100 degrees with pain. Decreased R hip IR/ER PROM by 75% compared to L     L hip PROM WFL    Neurological Testing     Reflexes   Left   Patellar (L4): absent (0)  Achilles (S1): absent (0)  Ankle clonus reflex: negative    Right   Patellar (L4): absent (0)  Achilles (S1): absent (0)  Ankle clonus reflex: negative    Comments   Left Patellar (L4): pt with difficulty relaxing bilaterally    Myotome testing   Lumbar (left)   All left lumbar myotomes within normal limits    Lumbar (right)   L2 (hip flexors): 3  L3 (knee extensors): 4+  L4 (ankle dorsiflexors): 4  L5 (great toe extension): 5  S1 (ankle plantar flexors): 4    Dermatome testing   Lumbar (left)   All left lumbar dermatomes intact    Lumbar (right)   All right lumbar dermatomes intact    Palpation   Left   Tenderness of the lumbar paraspinals.     Right   Tenderness of the iliopsoas, iliotibial, lumbar paraspinals, piriformis, quadratus lumborum and TFL.     Additional Palpation Details  TTP B thoracic paraspinals    Tenderness     Left Hip   Tenderness in the ASIS, PSIS, sacroiliac joint and sacrum.      Right Hip   Tenderness in the ASIS, PSIS, greater trochanter, iliac crest, IT band, sacroiliac joint and sacrum.     Active Range of Motion     Lumbar   Flexion: decreased (FF to mid shin )  Extension: decreased (25%)  Left lateral flexion: within functional limits  Right lateral flexion: decreased (25%)  Left rotation: within functional limits  Right rotation: within functional  "limits    Additional Active Range of Motion Details  All AROM with pain    Joint Play   Spine     Central PA [place]        T10: WFL       T11: WFL       T12: WFL       L1: hypomobile and painful       L2: hypomobile and painful       L3: hypomobile and painful       L4: hypomobile and painful       L5: hypomobile       S1: hypomobile        Strength:      Abdominals   Lower abdominals: Able to initiate but not maintain neutral    Right Hip   Planes of Motion   Abduction: 4-  Adduction: 4-    Tests     Right Hip   Positive outflare.   SLR: Positive.     Additional Tests Details  L anterior zuleyma    Ozzy LumbarTest   Static tests   Sitting slouched: Increased pain- pt unable to tolerate    Lying repeated motions:   Flexion in lying     Symptoms during testing: increases    Symptoms after testing: worse  Extension in lying     Symptoms during testing: no effect    Symptoms after testing: no effect        Therapeutic Exercises (CPT 62195):     1. Prone lying with 2 pillows, x1 min (at beginning and end of session)    2. Piriformis stretch , x1 min ea     3. TrA activation and education , x3 min     4. Supine butterfly stretch, 30\" x2     17. UPOC 12/7/20      Therapeutic Exercise Summary: Pt educated in HEP, provided with a handout    Therapeutic Treatments and Modalities:     1. E Stim Unattended (CPT 76311), IFC and HP to L/S (pt in R sidelying), x15 min     Time-based treatments/modalities:    Physical Therapy Timed Treatment Charges  Therapeutic exercise minutes (CPT 52394): 10 minutes      Assessment, Response and Plan:   Impairments: abnormal or restricted ROM, activity intolerance, difficulty performing job, impaired physical strength and pain with function    Assessment details:  Pt is a pleasant 45 yo female that presents to PT with signs and symptoms most consistent with lumbar derangement with possible sacral dysfunction. She demo'd best tolerance to gentle extension based program due to pain sensitivity " and this will continue to be assessed.She would benefit from skilled PT to address above listed functional impairments, improve function and activity tolerance, and enhance QOL.    Barriers to therapy: Pt with limited days off.  Prognosis: fair    Goals:   Short Term Goals:   1. Pt will be independent and compliant with HEP  2. Pt will tolerate prone lying without need for pillows  Short term goal time span:  2-4 weeks      Long Term Goals:    1. Pt will tolerate sitting for 30 min or more without increase in symptoms  2. Pt will tolerate standing for 1 hour without increase in symptoms  3. Pt will demo appropriate lift techniques to prevent overuse of L/S  4. Improve Revised Oswestry by 15% to demo improvement in overall function with less pain  Long term goal time span:  4-6 weeks    Plan:   Therapy options:  Physical therapy treatment to continue  Planned therapy interventions:  E Stim Unattended (CPT 70052), Manual Therapy (CPT 01422), Neuromuscular Re-education (CPT 42964), Mechanical Traction (CPT 87455) and Therapeutic Exercise (CPT 59620)  Frequency: 1-2x/week based on pt's availability.  Duration in weeks:  6  Duration in visits:  12  Discussed with:  Patient      Functional Assessment Used  PT Functional Assessment Tool Used: Low Back Disability Questionnaire  PT Functional Assessment Score: 30/100     Referring provider co-signature:  I have reviewed this plan of care and my co-signature certifies the need for services.    Certification Period: 10/26/2020 to  12/07/20    Physician Signature: ________________________________ Date: ______________

## 2020-11-09 ENCOUNTER — PHYSICAL THERAPY (OUTPATIENT)
Dept: PHYSICAL THERAPY | Facility: REHABILITATION | Age: 45
End: 2020-11-09
Attending: FAMILY MEDICINE
Payer: COMMERCIAL

## 2020-11-09 DIAGNOSIS — M54.41 ACUTE MIDLINE LOW BACK PAIN WITH RIGHT-SIDED SCIATICA: ICD-10-CM

## 2020-11-09 PROCEDURE — 97014 ELECTRIC STIMULATION THERAPY: CPT

## 2020-11-09 PROCEDURE — 97110 THERAPEUTIC EXERCISES: CPT

## 2020-11-09 NOTE — OP THERAPY DAILY TREATMENT
"  Outpatient Physical Therapy  DAILY TREATMENT     [facility] [place]  [address]  [place] [place] [facility] [address]  Phone:  [phone]  Fax:  [phone]    Date: 11/09/2020    Patient: Sadaf Nunez  YOB: 1975  MRN: [id number]     Time Calculation    Start time: 1330  Stop time: 1420 Time Calculation (min): 50 minutes         Chief Complaint: Back Problem    Visit #: 2    SUBJECTIVE:  Able to sit for about 1.5 hours on trip to [facility]- had to use lumbar support in car, but was very sore when I got there. Standing tolerance is improving and pain down the leg is happening less often.     OBJECTIVE:          Therapeutic Exercises (CPT 40605):     1. Prone lying with 1 pillow, x2 min (at beginning of session, no pillows at end ofsession)    2. Piriformis stretch , x1 min ea , ER    3. SKTC , 15\" x4 B    4. Supine butterfly stretch, review    5. TrA, 5\" x10    6. TrA + heel slide, x5 B    7. TrA + BKFO, x7 B    8. TrA +march, x10    9. TrA + hip add, towel 5\" x5    10. TrA + hip abd isometric, with belt 5\" x2, pain, DC    17. UPOC 12/7/20      Therapeutic Exercise Summary: Updated HEP for stabilization, provided with a handout    Therapeutic Treatments and Modalities:     1. E Stim Unattended (CPT 61849), IFC and HP to L/S (pt in R sidelying), x15 min     Time-based treatments/modalities:    Physical Therapy Timed Treatment Charges  Therapeutic exercise minutes (CPT 36726): 25 minutes          ASSESSMENT:   Response to treatment: Pt with improved tolerance to extension at end of session as she did not require pillows. She reports decrease in radicular symptom frequency, but she does still remain sensitive to movement and exercises require monitoring to prevent increase in pain.     PLAN/RECOMMENDATIONS:   Plan for treatment: therapy treatment to continue next visit.  Planned interventions for next visit: continue with current treatment. Continue with slow progression with " extension and core stab.

## 2020-11-16 ENCOUNTER — PHYSICAL THERAPY (OUTPATIENT)
Dept: PHYSICAL THERAPY | Facility: REHABILITATION | Age: 45
End: 2020-11-16
Attending: FAMILY MEDICINE
Payer: COMMERCIAL

## 2020-11-16 DIAGNOSIS — M54.41 ACUTE MIDLINE LOW BACK PAIN WITH RIGHT-SIDED SCIATICA: ICD-10-CM

## 2020-11-16 PROCEDURE — 97014 ELECTRIC STIMULATION THERAPY: CPT

## 2020-11-16 PROCEDURE — 97035 APP MDLTY 1+ULTRASOUND EA 15: CPT

## 2020-11-16 PROCEDURE — 97140 MANUAL THERAPY 1/> REGIONS: CPT

## 2020-11-16 RX ORDER — DULOXETIN HYDROCHLORIDE 20 MG/1
CAPSULE, DELAYED RELEASE ORAL
Qty: 30 CAP | Refills: 0 | Status: SHIPPED | OUTPATIENT
Start: 2020-11-16 | End: 2020-11-17 | Stop reason: SDUPTHER

## 2020-11-16 NOTE — OP THERAPY DAILY TREATMENT
Outpatient Physical Therapy  DAILY TREATMENT     [facility] [place]  [address]  [place] [address]  Phone:  [phone]  Fax:  [phone]    Date: 11/16/2020    Patient: Sadaf Nunez  YOB: 1975  MRN: [id number]     Time Calculation    Start time: 1330  Stop time: 1430 Time Calculation (min): 60 minutes         Chief Complaint: Back Problem    Visit #: 3    SUBJECTIVE:  Have been in a lot of pain for the last week. Was fine when I left here last time, but then pain has been intense in middle to R side of low back. Not getting pain in to R leg. Have telehealth appt with MD tomorrow.     OBJECTIVE:          Therapeutic Exercises (CPT 88773):     1. Prone lying with R lateral shift, x10    2. ALYSSA x2 min    3. L lateral wall leans, x5, pt reported some relief    17. UPOC 12/7/20    Therapeutic Treatments and Modalities:     1. E Stim Unattended (CPT 11824), IFC and HP to L/S (pt in R sidelying), x15 min     2. Ultrasound(CPT 62613), 1 MHz, 0.8 w/cm2, 50% x5 min ea to R and L lower lumbar paraspinals x 10 min total    3. Manual Therapy (CPT 70829), STM low back, thoracic and lumbar paraspinals;  Gr. II CPA to L4-S1, gentle rotational mobs with pt in R SL      Time-based treatments/modalities:    Physical Therapy Timed Treatment Charges  Manual therapy minutes (CPT 73076): 20 minutes  Ultrasound minutes (CPT 49866): 10 minutes      Pain rating (1-10) before treatment:  10  Pain rating (1-10) after treatment:  10    ASSESSMENT:   Response to treatment: Pt in significant pain today, unable to tolerate most positions. Had best tolerance to prone with R lateral shift, but pain relief was short term. Recommended pt discuss pain and inflammation options as well as possible imaging options with MD.     PLAN/RECOMMENDATIONS:   Plan for treatment: therapy treatment to continue next visit.  Planned interventions for next visit: continue with current treatment. Review  directional preference if able

## 2020-11-17 ENCOUNTER — TELEMEDICINE (OUTPATIENT)
Dept: MEDICAL GROUP | Facility: PHYSICIAN GROUP | Age: 45
End: 2020-11-17
Payer: COMMERCIAL

## 2020-11-17 VITALS — WEIGHT: 211 LBS | BODY MASS INDEX: 33.91 KG/M2 | HEIGHT: 66 IN

## 2020-11-17 DIAGNOSIS — F33.1 MODERATE EPISODE OF RECURRENT MAJOR DEPRESSIVE DISORDER (HCC): ICD-10-CM

## 2020-11-17 DIAGNOSIS — R12 HEARTBURN: Primary | ICD-10-CM

## 2020-11-17 DIAGNOSIS — M54.41 ACUTE MIDLINE LOW BACK PAIN WITH RIGHT-SIDED SCIATICA: ICD-10-CM

## 2020-11-17 DIAGNOSIS — F41.9 ANXIETY: ICD-10-CM

## 2020-11-17 PROCEDURE — 99214 OFFICE O/P EST MOD 30 MIN: CPT | Mod: 95,CR | Performed by: FAMILY MEDICINE

## 2020-11-17 RX ORDER — DULOXETINE 40 MG/1
40 CAPSULE, DELAYED RELEASE ORAL
Qty: 30 CAP | Refills: 1 | Status: SHIPPED | OUTPATIENT
Start: 2020-11-17 | End: 2020-12-22

## 2020-11-17 RX ORDER — METHYLPREDNISOLONE 4 MG/1
TABLET ORAL
Qty: 21 TAB | Refills: 0 | Status: SHIPPED | OUTPATIENT
Start: 2020-11-17 | End: 2020-12-22

## 2020-11-17 NOTE — ASSESSMENT & PLAN NOTE
"This is a chronic condition. She states she gets really bad heartburn. She was on omeprazole for \"a long time\" but stopped when she ran out of insurance. At her last appt prescribed famotidine which is working well.  "

## 2020-11-17 NOTE — ASSESSMENT & PLAN NOTE
This is a chronic condition.  Onset: years  PHQ screen: 14/27 (8/2020)  Suicidal ideation/homicidal ideation: no/no  Therapy/counseling: not currently - not interested in seeing therapy  Medications: duloxetine 20 mg daily  Side effects: diarrhea - every morning  Improving/worsening: ~50% better

## 2020-11-17 NOTE — ASSESSMENT & PLAN NOTE
This is a chronic condition.  She has a history of severe anxiety.  At her last appointment I did prescribe some medication help with this.  Current meds: duloxetine 20 mg daily  Anxiety attacks: once every couple days  Side effects: diarrhea daily  Improvement: ~50% better

## 2020-11-17 NOTE — PROGRESS NOTES
"Virtual Visit: Established Patient   This visit was conducted via Zoom using secure and encrypted videoconferencing technology. The patient was in a private location in the state of [place].    The patient's identity was confirmed and verbal consent was obtained for this virtual visit.    Subjective:   CC:   Chief Complaint   Patient presents with   • Medication Management   • Orders Needed     PT suggests Xray of lower back        Sadaf Nunez is a 44 y.o. female presenting for evaluation and management of:    Heartburn  This is a chronic condition. She states she gets really bad heartburn. She was on omeprazole for \"a long time\" but stopped when she ran out of insurance. At her last appt prescribed famotidine which is working well.    Depression  This is a chronic condition.  Onset: years  PHQ screen: 14/27 (8/2020)  Suicidal ideation/homicidal ideation: no/no  Therapy/counseling: not currently - not interested in seeing therapy  Medications: duloxetine 20 mg daily  Side effects: diarrhea - every morning  Improving/worsening: ~50% better      Anxiety  This is a chronic condition.  She has a history of severe anxiety.  At her last appointment I did prescribe some medication help with this.  Current meds: duloxetine 20 mg daily  Anxiety attacks: once every couple days  Side effects: diarrhea daily  Improvement: ~50% better    Back pain  This is an acute condition. She started PT but PT is requesting a XR because she is in \"so much pain\". PT wonders if there are slipped discs. She is doing exercises at home. They help a little, but not a lot. Pain is staying at a level of 7-9/10. She is getting symptoms of sciatica on the left side. The tizanidine helps at night but not using at day because it makes her sleepy. She has tried tylenol 1200 mg in a day.      ROS   Denies any recent fevers or chills. No chest pains or shortness of breath.     No Known Allergies    Current medicines (including changes " "today)  Current Outpatient Medications   Medication Sig Dispense Refill   • DULoxetine 40 MG Cap DR Particles Take 40 mg by mouth every day. 30 Cap 1   • methylPREDNISolone (MEDROL DOSEPAK) 4 MG Tablet Therapy Pack As directed on the packaging label. 21 Tab 0   • famotidine (PEPCID) 20 MG Tab Take 1 Tab by mouth 2 times a day. 180 Tab 0   • tizanidine (ZANAFLEX) 4 MG Tab Take 1 Tab by mouth every 6 hours as needed. 30 Tab 3     No current facility-administered medications for this visit.        Patient Active Problem List    Diagnosis Date Noted   • Heartburn 09/21/2020   • Back pain 09/21/2020   • Obesity (BMI 30-39.9) 08/24/2020   • Nicotine dependence 08/24/2020   • Insomnia 02/05/2016   • Anxiety 01/29/2016   • Depression 01/29/2016       Family History   Problem Relation Age of Onset   • Diabetes Mother    • Psychiatric Illness Mother    • Alcohol abuse Father    • Psychiatric Illness Father    • Cancer Maternal Grandfather         cancer stomach   • Stroke Maternal Grandmother    • Heart Disease Neg Hx        She  has a past medical history of Anxiety, Anxiety and depression, Bipolar 1 disorder (HCC), Gynecological disorder, Heart burn, Hemorrhagic disorder (HCC), History of abnormal cervical Pap smear, Indigestion, and UTI (lower urinary tract infection).  She  has a past surgical history that includes tonsillectomy; cholecystectomy; gyn surgery (07/1995); anterior and posterior repair (N/A, 5/3/2016); enterocele repair (N/A, 5/3/2016); vaginal suspension (N/A, 5/3/2016); bladder sling female (N/A, 5/3/2016); vaginal hysterectomy scope total (N/A, 5/3/2016); and tubal coagulation laparoscopic bilateral.       Objective:   Ht 1.676 m (5' 6\") Comment: Patient reported  Wt 95.7 kg (211 lb) Comment: Patient reported  BMI 34.06 kg/m²  RR: 14    Physical Exam:  Constitutional: Alert, no distress, well-groomed.  Skin: No rashes in visible areas.  Eye: Round. Conjunctiva clear, lids normal. No icterus.   ENMT: Lips " "pink without lesions, good dentition, moist mucous membranes. Phonation normal.  Neck: No masses, no thyromegaly. Moves freely without pain.  Respiratory: Unlabored respiratory effort, no cough or audible wheeze  Psych: Alert and oriented x3, normal affect and mood.       Assessment and Plan:   The following treatment plan was discussed:     1. Heartburn  This is a chronic condition, controlled.  I also restarted her on famotidine she is been taking it twice daily.  She reports is working well to control her symptoms.  -Continue famotidine twice daily    2. Moderate episode of recurrent major depressive disorder (HCC)  This is a chronic condition, improved.  Her last appointment we transitioned her from citalopram to duloxetine.  She does note approximate 50% improvement with her depression.  She is getting diarrhea every morning but states is fairly tolerable and would like to continue with the medication since it is helping with her mood.  She denies any suicidal or homicidal ideation today.  -Increase duloxetine to 40 mg daily    3. Anxiety  This is a chronic condition, improving.  As discussed above, she states she has an approximate 50% improvement in her anxiety on duloxetine.  She still getting anxiety attacks every few days so we will try increasing the dose.  -Increase duloxetine to 40 mg daily    4. Acute midline low back pain with right-sided sciatica  This is an acute condition.  For the last 2 months has been getting lower back pain in the midline with some associated sciatica, especially on the left side.  She started physical therapy and has had 3 sessions.  However, physical therapy would like imaging to evaluate for slipped disc as she is in \"so much pain\".  She been doing home exercises but only helps a little bit.  She will use the tizanidine at night which helps but she cannot use it throughout the day due to sleepiness.  She is been using Tylenol but only up to 1200 mg in a day.  -Continue " tizanidine 4 mg as needed at night  -We discussed that she can use Tylenol up to 4000 mg in a day, I recommended 1000 mg per dose  -We will do Medrol Dosepak since there is some radiculopathy and her pain is still quite significant  - DX-LUMBAR SPINE-2 OR 3 VIEWS; Future    Follow-up: Return in about 4 weeks (around 12/15/2020) for f/u anxiety, depression, back pain - VIRTUAL.

## 2020-11-17 NOTE — ASSESSMENT & PLAN NOTE
"This is an acute condition. She started PT but PT is requesting a XR because she is in \"so much pain\". PT wonders if there are slipped discs. She is doing exercises at home. They help a little, but not a lot. Pain is staying at a level of 7-9/10. She is getting symptoms of sciatica on the left side. The tizanidine helps at night but not using at day because it makes her sleepy. She has tried tylenol 1200 mg in a day.  "

## 2020-11-18 ENCOUNTER — HOSPITAL ENCOUNTER (OUTPATIENT)
Dept: RADIOLOGY | Facility: MEDICAL CENTER | Age: 45
End: 2020-11-18
Attending: FAMILY MEDICINE
Payer: COMMERCIAL

## 2020-11-18 DIAGNOSIS — M54.41 ACUTE MIDLINE LOW BACK PAIN WITH RIGHT-SIDED SCIATICA: ICD-10-CM

## 2020-11-18 PROCEDURE — 72100 X-RAY EXAM L-S SPINE 2/3 VWS: CPT

## 2020-11-23 ENCOUNTER — APPOINTMENT (OUTPATIENT)
Dept: PHYSICAL THERAPY | Facility: REHABILITATION | Age: 45
End: 2020-11-23
Attending: FAMILY MEDICINE
Payer: COMMERCIAL

## 2020-12-22 ENCOUNTER — TELEMEDICINE (OUTPATIENT)
Dept: MEDICAL GROUP | Facility: PHYSICIAN GROUP | Age: 45
End: 2020-12-22
Payer: COMMERCIAL

## 2020-12-22 VITALS — HEIGHT: 66 IN | BODY MASS INDEX: 33.91 KG/M2 | WEIGHT: 211 LBS

## 2020-12-22 DIAGNOSIS — F33.1 MODERATE EPISODE OF RECURRENT MAJOR DEPRESSIVE DISORDER (HCC): ICD-10-CM

## 2020-12-22 DIAGNOSIS — M54.41 ACUTE MIDLINE LOW BACK PAIN WITH RIGHT-SIDED SCIATICA: ICD-10-CM

## 2020-12-22 DIAGNOSIS — F41.9 ANXIETY: Primary | ICD-10-CM

## 2020-12-22 PROCEDURE — 99214 OFFICE O/P EST MOD 30 MIN: CPT | Mod: 95,CR | Performed by: FAMILY MEDICINE

## 2020-12-22 RX ORDER — DULOXETIN HYDROCHLORIDE 60 MG/1
60 CAPSULE, DELAYED RELEASE ORAL DAILY
Qty: 30 CAP | Refills: 0 | Status: SHIPPED | OUTPATIENT
Start: 2020-12-22 | End: 2021-02-08 | Stop reason: SDUPTHER

## 2020-12-22 RX ORDER — TIZANIDINE 4 MG/1
4 TABLET ORAL EVERY 6 HOURS PRN
Qty: 30 TAB | Refills: 3 | Status: SHIPPED | OUTPATIENT
Start: 2020-12-22 | End: 2021-03-09 | Stop reason: SDUPTHER

## 2020-12-22 NOTE — PROGRESS NOTES
Virtual Visit: Established Patient   This visit was conducted via Zoom using secure and encrypted videoconferencing technology. The patient was in a private location in the state of [place].    The patient's identity was confirmed and verbal consent was obtained for this virtual visit.    Subjective:   CC:   Chief Complaint   Patient presents with   • Anxiety     No change   • Depression     No change    • Low Back Pain     No change        Sadaf Nunez is a 44 y.o. female presenting for evaluation and management of:    Anxiety  This is a chronic condition.  She has a history of severe anxiety.  At her last appointment I did prescribe some medication help with this.  Current meds: duloxetine 40 mg daily  Anxiety attacks: 2-3x/week - really triggered by work  Side effects: diarrhea daily  Improvement: worsened - been off work since beginning of December because due family getting COVID. She is back at work but not improving, working 6-7 days a week.    Depression  This is a chronic condition.  Onset: years  PHQ screen: 14/27 (8/2020)  Suicidal ideation/homicidal ideation: no/no  Therapy/counseling: not currently - not interested in seeing therapy  Medications: duloxetine 40 mg daily  Side effects: diarrhea - every morning  Improving/worsening: ~50% better**      Back pain  This is an acute condition.  She has been having back pain for last 3 months.  She is following with physical therapy.  At her last appointment I did order an x-ray and a Medrol Dosepak as she was in significant pain.  X-ray indicated degenerative disc disease and mild loss of disc height in some levels. She quit the physical therapy after ~1 month as she felt it was making it worse instead of better. So currently the back pain is the same. She gets occasional sciatica down left leg, but not as much as before.     No saddle anesthesia, no urinary retention, no urinary/bowel incontinence, no fevers/chills, age <50      ROS   Denies any  "recent fevers or chills. No chest pains or shortness of breath.     No Known Allergies    Current medicines (including changes today)  Current Outpatient Medications   Medication Sig Dispense Refill   • DULoxetine (CYMBALTA) 60 MG Cap DR Particles delayed-release capsule Take 1 Cap by mouth every day. 30 Cap 0   • tizanidine (ZANAFLEX) 4 MG Tab Take 1 Tab by mouth every 6 hours as needed. 30 Tab 3   • famotidine (PEPCID) 20 MG Tab Take 1 Tab by mouth 2 times a day. 180 Tab 0     No current facility-administered medications for this visit.        Patient Active Problem List    Diagnosis Date Noted   • Heartburn 09/21/2020   • Back pain 09/21/2020   • Obesity (BMI 30-39.9) 08/24/2020   • Nicotine dependence 08/24/2020   • Insomnia 02/05/2016   • Anxiety 01/29/2016   • Depression 01/29/2016       Family History   Problem Relation Age of Onset   • Diabetes Mother    • Psychiatric Illness Mother    • Alcohol abuse Father    • Psychiatric Illness Father    • Cancer Maternal Grandfather         cancer stomach   • Stroke Maternal Grandmother    • Heart Disease Neg Hx        She  has a past medical history of Anxiety, Anxiety and depression, Bipolar 1 disorder (HCC), Gynecological disorder, Heart burn, Hemorrhagic disorder (HCC), History of abnormal cervical Pap smear, Indigestion, and UTI (lower urinary tract infection).  She  has a past surgical history that includes tonsillectomy; cholecystectomy; gyn surgery (07/1995); anterior and posterior repair (N/A, 5/3/2016); enterocele repair (N/A, 5/3/2016); vaginal suspension (N/A, 5/3/2016); bladder sling female (N/A, 5/3/2016); vaginal hysterectomy scope total (N/A, 5/3/2016); and tubal coagulation laparoscopic bilateral.       Objective:   Ht 1.676 m (5' 6\") Comment: Patient reported  Wt 95.7 kg (211 lb) Comment: Patient reported  BMI 34.06 kg/m²  RR: 12    Physical Exam:  Constitutional: Alert, no distress, well-groomed.  Skin: No rashes in visible areas.  Eye: Round. " "Conjunctiva clear, lids normal. No icterus.   ENMT: Lips pink without lesions, good dentition, moist mucous membranes. Phonation normal.  Neck: No masses, no thyromegaly. Moves freely without pain.  Respiratory: Unlabored respiratory effort, no cough or audible wheeze  Psych: Alert and oriented x3, normal affect and mood.       Assessment and Plan:   The following treatment plan was discussed:     1. Anxiety  2. Moderate episode of recurrent major depressive disorder (HCC)  These are chronic conditions, uncontrolled.  She has had anxiety for several years but has been worsening.  Her ALEC 7 score indicates severe anxiety.  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  Her depression score today indicates moderate depression.  She denies any suicidal or homicidal ideation today. At her last appointment increase her duloxetine as it was working pretty well for her and she wanted to get better improvement.  However, today she states that her depression and anxiety have worsened.  She states is because she is been off work since beginning of December due to Covid infection in the family.  She is now back to work but that did not improve her anxiety or depression.  A lot of her mood I believe is strongly influenced by stress at work. She has been on multiple benzodiazepines in the past and has tried fluoxetine, sertraline, Zyprexa, Effexor, olanzapine, and Seroquel in the past.  Fluoxetine she could not tolerate, sertraline, Zyprexa, Effexor, olanzapine all did not work.  Seroquel helped but it was such a high dose she felt like a \"zombie\".   -Increase duloxetine to 60 mg daily  -If no change in mood at next visit, refer to psychiatry as she has an extensive history of multiple medications tried to manage her mood    3. Acute midline low back pain with right-sided sciatica  This is an acute condition, unchanged.  For 3 months has been getting midline low back pain with right-sided " sciatica.  She states the sciatica is not as frequent as was before but the pain itself is unchanged.  She states she only did PT for about a month because it seemed to making her pain worse instead of better.  We did get an x-ray which showed DDD and mild disc height loss indicating some probable disc herniation.  She does not have any red flag symptoms but would like to see a physiatrist to talk about other treatment modalities.  - REFERRAL TO PHYSIATRY (PMR)    Follow-up: Return in about 4 weeks (around 1/19/2021) for f/u anxiety, depression.

## 2020-12-22 NOTE — ASSESSMENT & PLAN NOTE
This is a chronic condition.  She has a history of severe anxiety.  At her last appointment I did prescribe some medication help with this.  Current meds: duloxetine 40 mg daily  Anxiety attacks: 2-3x/week - really triggered by work  Side effects: diarrhea daily  Improvement: worsened - been off work since beginning of December because due family getting COVID. She is back at work but not improving, working 6-7 days a week.

## 2020-12-22 NOTE — ASSESSMENT & PLAN NOTE
This is an acute condition.  She has been having back pain for last 3 months.  She is following with physical therapy.  At her last appointment I did order an x-ray and a Medrol Dosepak as she was in significant pain.  X-ray indicated degenerative disc disease and mild loss of disc height in some levels. She quit the physical therapy after ~1 month as she felt it was making it worse instead of better. So currently the back pain is the same. She gets occasional sciatica down left leg, but not as much as before.     No saddle anesthesia, no urinary retention, no urinary/bowel incontinence, no fevers/chills, age <50

## 2020-12-22 NOTE — ASSESSMENT & PLAN NOTE
This is a chronic condition.  Onset: years  PHQ screen: 14/27 (8/2020)  Suicidal ideation/homicidal ideation: no/no  Therapy/counseling: not currently - not interested in seeing therapy  Medications: duloxetine 40 mg daily  Side effects: diarrhea - every morning  Improving/worsening: ~50% better**

## 2021-01-04 ENCOUNTER — TELEPHONE (OUTPATIENT)
Dept: PHYSICAL THERAPY | Facility: REHABILITATION | Age: 46
End: 2021-01-04

## 2021-01-04 NOTE — OP THERAPY DISCHARGE SUMMARY
Outpatient Physical Therapy  DISCHARGE SUMMARY NOTE      [facility] [place]  [address], [address]  [place] [address]  Phone:  [phone]  Fax:  [phone]    Date of Visit: 01/04/2021    Patient: Sadaf Nunez  YOB: 1975  MRN: [id number]     Referring Provider: Amanda Peters M.D.  [address]  [address] [place] [address]   Referring Diagnosis Acute midline low back pain with right-sided sciatica [M54.41]           Your patient is being discharged from Physical Therapy with the following comments:   · Patient has failed to schedule or reschedule follow-up visits    Comments:  Sadaf has not returned to PT or made further contact with our facility since her last appointment on 11/16/20. Pt's current status is unknown.     Limitations Remaining:  Unknown as pt has not returned to PT.     Recommendations:  Pt will be DC'd from PT at this time. Recommend she continue with MD follow-ups as she was in significant pain at last PT session.     Alexa Loaiza, PT, DPT    Date: 1/4/2021

## 2021-02-08 ENCOUNTER — TELEMEDICINE (OUTPATIENT)
Dept: MEDICAL GROUP | Facility: PHYSICIAN GROUP | Age: 46
End: 2021-02-08
Payer: COMMERCIAL

## 2021-02-08 VITALS — WEIGHT: 211 LBS | BODY MASS INDEX: 33.91 KG/M2 | HEIGHT: 66 IN

## 2021-02-08 DIAGNOSIS — F33.1 MODERATE EPISODE OF RECURRENT MAJOR DEPRESSIVE DISORDER (HCC): ICD-10-CM

## 2021-02-08 DIAGNOSIS — F51.05 INSOMNIA DUE TO OTHER MENTAL DISORDER: ICD-10-CM

## 2021-02-08 DIAGNOSIS — F41.9 ANXIETY: Primary | ICD-10-CM

## 2021-02-08 DIAGNOSIS — F99 INSOMNIA DUE TO OTHER MENTAL DISORDER: ICD-10-CM

## 2021-02-08 PROCEDURE — 99214 OFFICE O/P EST MOD 30 MIN: CPT | Mod: 95,CR | Performed by: FAMILY MEDICINE

## 2021-02-08 RX ORDER — ALPRAZOLAM 0.25 MG/1
0.25 TABLET ORAL EVERY 12 HOURS PRN
Qty: 60 TAB | Refills: 0 | Status: SHIPPED | OUTPATIENT
Start: 2021-02-08 | End: 2021-03-09

## 2021-02-08 RX ORDER — DULOXETIN HYDROCHLORIDE 60 MG/1
60 CAPSULE, DELAYED RELEASE ORAL DAILY
Qty: 90 CAP | Refills: 0 | Status: SHIPPED | OUTPATIENT
Start: 2021-02-08 | End: 2021-03-09

## 2021-02-08 NOTE — PROGRESS NOTES
"Virtual Visit: Established Patient   This visit was conducted via Zoom using secure and encrypted videoconferencing technology. The patient was in a private location in the state of [place].    The patient's identity was confirmed and verbal consent was obtained for this virtual visit.    Subjective:   CC:   Chief Complaint   Patient presents with   • Medication Management       Sadaf Nunez is a 45 y.o. female presenting for evaluation and management of:    Anxiety  This is a chronic condition.  She has a history of severe anxiety.  At her last appointment I increased her duloxetine  Current meds: duloxetine 60 mg daily  Anxiety attacks: daily - really triggered by work and now this new stress  Side effects: diarrhea daily  Improvement: improved - but going through \"some crap now\".     She is going through a separation, \"it's ugly\". She also notes she is not sleeping again.    Depression  This is a chronic condition.  Onset: years  PHQ screen: 14/27 (8/2020)  Suicidal ideation/homicidal ideation:   Therapy/counseling: not currently - not interested in seeing therapy, can't afford to see one currently  Medications: duloxetine 60 mg daily  Side effects: diarrhea - every morning  Improving/worsening: improved but recently worsened due to separation      Insomnia  This is a chronic condition. She is having much difficulty falling and staying asleep. It has gotten worse as the anxiety has worsened.        ROS   Denies any recent fevers or chills. No chest pains or shortness of breath.     No Known Allergies    Current medicines (including changes today)  Current Outpatient Medications   Medication Sig Dispense Refill   • ALPRAZolam (XANAX) 0.25 MG Tab Take 1 Tab by mouth every 12 hours as needed for Sleep or Anxiety for up to 30 days. 60 Tab 0   • DULoxetine (CYMBALTA) 60 MG Cap DR Particles delayed-release capsule Take 1 Cap by mouth every day. 90 Cap 0   • tizanidine (ZANAFLEX) 4 MG Tab Take 1 Tab by mouth " "every 6 hours as needed. 30 Tab 3   • famotidine (PEPCID) 20 MG Tab Take 1 Tab by mouth 2 times a day. 180 Tab 0     No current facility-administered medications for this visit.        Patient Active Problem List    Diagnosis Date Noted   • Heartburn 09/21/2020   • Back pain 09/21/2020   • Obesity (BMI 30-39.9) 08/24/2020   • Nicotine dependence 08/24/2020   • Insomnia 02/05/2016   • Anxiety 01/29/2016   • Depression 01/29/2016       Family History   Problem Relation Age of Onset   • Diabetes Mother    • Psychiatric Illness Mother    • Alcohol abuse Father    • Psychiatric Illness Father    • Cancer Maternal Grandfather         cancer stomach   • Stroke Maternal Grandmother    • Heart Disease Neg Hx        She  has a past medical history of Anxiety, Anxiety and depression, Bipolar 1 disorder (HCC), Gynecological disorder, Heart burn, Hemorrhagic disorder (HCC), History of abnormal cervical Pap smear, Indigestion, and UTI (lower urinary tract infection).  She  has a past surgical history that includes tonsillectomy; cholecystectomy; gyn surgery (07/1995); anterior and posterior repair (N/A, 5/3/2016); enterocele repair (N/A, 5/3/2016); vaginal suspension (N/A, 5/3/2016); bladder sling female (N/A, 5/3/2016); vaginal hysterectomy scope total (N/A, 5/3/2016); and tubal coagulation laparoscopic bilateral.       Objective:   Ht 1.676 m (5' 6\") Comment: Patient reported  Wt 95.7 kg (211 lb) Comment: Patient reported  BMI 34.06 kg/m²  RR: 14    Physical Exam:  Constitutional: Alert, mild distress - tearful, well-groomed.  Skin: No rashes in visible areas.  Eye: Round. Conjunctiva clear, lids normal. No icterus.   ENMT: Lips pink without lesions, good dentition, moist mucous membranes. Phonation normal.  Neck: No masses, no thyromegaly. Moves freely without pain.  Respiratory: Unlabored respiratory effort, no cough or audible wheeze  Psych: Alert and oriented x3, normal affect and mood.       Assessment and Plan:   The " following treatment plan was discussed:     1. Anxiety  This is a chronic condition, worsened.  At her last appointment we increased her duloxetine.  She states initially it was helping with her anxiety but she is now going through a separation from her  and that has been extremely stressful is made her anxiety much worse.  She is now getting anxiety attacks daily instead of a couple times per week.  She tried hydroxyzine in the past which did not improve her anxiety attacks.  She has no cardiac symptoms so I do not believe propranolol will be helpful.  Informed consent to controlled substance treatment agreement was reviewed verbally.  Obtained and reviewed patient utilization report from [facility] pharmacy database on 2/8/2021 11:08 AM  prior to writing prescription for controlled substance II, III or IV per [place] bill . Based on assessment of the report, the prescription is medically necessary.   -Continue duloxetine 60 mg daily  - ALPRAZolam (XANAX) 0.25 MG Tab; Take 1 Tab by mouth every 12 hours as needed for Sleep or Anxiety for up to 30 days.  Dispense: 60 Tab; Refill: 0    2. Moderate episode of recurrent major depressive disorder (HCC)  This is a chronic condition, worsened.  As discussed above, we increased her duloxetine at the last appointment and it was helping but it has gotten worse due to the separation from her .  -Continue duloxetine 60 mg daily    3. Insomnia due to other mental disorder  This is a chronic condition, worsened.  She has insomnia which is largely secondary to her mental health condition.  It is gotten much worse since her separation from her  and she is having much difficulty falling and staying asleep.  We will try alprazolam to help manage this as well.  - ALPRAZolam (XANAX) 0.25 MG Tab; Take 1 Tab by mouth every 12 hours as needed for Sleep or Anxiety for up to 30 days.  Dispense: 60 Tab; Refill: 0    Follow-up: Return in about 4 weeks (around  3/8/2021) for f/u anxiety, depression, xanax.

## 2021-02-08 NOTE — ASSESSMENT & PLAN NOTE
This is a chronic condition. She is having much difficulty falling and staying asleep. It has gotten worse as the anxiety has worsened.

## 2021-02-08 NOTE — ASSESSMENT & PLAN NOTE
This is a chronic condition.  Onset: years  PHQ screen: 14/27 (8/2020)  Suicidal ideation/homicidal ideation:   Therapy/counseling: not currently - not interested in seeing therapy, can't afford to see one currently  Medications: duloxetine 60 mg daily  Side effects: diarrhea - every morning  Improving/worsening: improved but recently worsened due to separation

## 2021-02-08 NOTE — ASSESSMENT & PLAN NOTE
"This is a chronic condition.  She has a history of severe anxiety.  At her last appointment I increased her duloxetine  Current meds: duloxetine 60 mg daily  Anxiety attacks: daily - really triggered by work and now this new stress  Side effects: diarrhea daily  Improvement: improved - but going through \"some crap now\".     She is going through a separation, \"it's ugly\". She also notes she is not sleeping again.  "

## 2021-03-04 ENCOUNTER — HOSPITAL ENCOUNTER (EMERGENCY)
Facility: MEDICAL CENTER | Age: 46
End: 2021-03-04
Attending: EMERGENCY MEDICINE
Payer: COMMERCIAL

## 2021-03-04 VITALS
SYSTOLIC BLOOD PRESSURE: 132 MMHG | HEART RATE: 98 BPM | HEIGHT: 66 IN | TEMPERATURE: 97.5 F | BODY MASS INDEX: 33.75 KG/M2 | DIASTOLIC BLOOD PRESSURE: 63 MMHG | WEIGHT: 210 LBS | OXYGEN SATURATION: 99 % | RESPIRATION RATE: 18 BRPM

## 2021-03-04 DIAGNOSIS — R45.851 SUICIDAL IDEATION: ICD-10-CM

## 2021-03-04 LAB
AMPHET UR QL SCN: NEGATIVE
BARBITURATES UR QL SCN: NEGATIVE
BENZODIAZ UR QL SCN: POSITIVE
BZE UR QL SCN: NEGATIVE
CANNABINOIDS UR QL SCN: NEGATIVE
METHADONE UR QL SCN: NEGATIVE
OPIATES UR QL SCN: NEGATIVE
OXYCODONE UR QL SCN: NEGATIVE
PCP UR QL SCN: NEGATIVE
POC BREATHALIZER: 0 PERCENT (ref 0–0.01)
PROPOXYPH UR QL SCN: NEGATIVE

## 2021-03-04 PROCEDURE — 80307 DRUG TEST PRSMV CHEM ANLYZR: CPT

## 2021-03-04 PROCEDURE — 90791 PSYCH DIAGNOSTIC EVALUATION: CPT

## 2021-03-04 PROCEDURE — 302970 POC BREATHALIZER

## 2021-03-04 PROCEDURE — 302970 POC BREATHALIZER: Performed by: EMERGENCY MEDICINE

## 2021-03-04 PROCEDURE — 700102 HCHG RX REV CODE 250 W/ 637 OVERRIDE(OP): Performed by: EMERGENCY MEDICINE

## 2021-03-04 PROCEDURE — 99284 EMERGENCY DEPT VISIT MOD MDM: CPT

## 2021-03-04 PROCEDURE — A9270 NON-COVERED ITEM OR SERVICE: HCPCS | Performed by: EMERGENCY MEDICINE

## 2021-03-04 RX ORDER — LORAZEPAM 1 MG/1
1 TABLET ORAL ONCE
Status: COMPLETED | OUTPATIENT
Start: 2021-03-04 | End: 2021-03-04

## 2021-03-04 RX ADMIN — LORAZEPAM 1 MG: 1 TABLET ORAL at 06:39

## 2021-03-04 NOTE — ED NOTES
Report to EMS. Pt transferred to [facility]. given back 1 bag of belongings, pt states she has everything she came in with. VSS. Ambulatory with steady gait.

## 2021-03-04 NOTE — ED NOTES
Report received from KINSEY Matthews. Assumed care of pt. Pt asleep in [place], respirations equal and unlabored. 1:1 sitter present in direct patient view.
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
## 2021-03-04 NOTE — ED NOTES
Pt amb to bathroom with steady gait. Pt sitting in bed, eating breakfast. No needs at this time. 1:1 sitter remains at door for pt safety

## 2021-03-04 NOTE — ED NOTES
Pt resting in bed with eyes closed, even unlabored resp observed. 1:1 sitter at door for pt safety

## 2021-03-04 NOTE — DISCHARGE PLANNING
CLAIR informed that Pt was accepted by [facility].  Accepting MD Reyes.  CLAIR completed [facility] PCS form and faxed all documentation to [facility].  CLAIR placed call to [facility] and was able to scheduled Pt for a 1300  time.  SW completed transfer packet (with original legal hold) and placed in medical chart for transfer.  ER RN updated.

## 2021-03-04 NOTE — DISCHARGE PLANNING
Medical Social Work    Referral: Legal Hold    Intervention: Legal Hold Paperwork given to SW by [employer] RN Samara    Legal Hold Initiated: Date: 3/4/21 Time: 0601    Patient’s Insurance Listed on Face Sheet: Sanjiv                                                                                                Referrals sent to: [facility] and [facility]    This referral contains the following information:  1) Face sheet ___x_  2) Page 1 and Page 2 of Legal Hold _x___  3) Alert Team Assessment/Psych Assessment _x___  4) Head to toe physical exam _x___  5) Urine Drug Screen __x__  6) Blood Alcohol __x__  7) Vital signs _x___  8) Pregnancy test when applicable _n/a__  9) Medications list _x___    Plan: Patient will transfer to mental health facility once acceptance is obtained

## 2021-03-09 ENCOUNTER — TELEMEDICINE (OUTPATIENT)
Dept: MEDICAL GROUP | Facility: PHYSICIAN GROUP | Age: 46
End: 2021-03-09
Payer: COMMERCIAL

## 2021-03-09 VITALS — BODY MASS INDEX: 33.75 KG/M2 | WEIGHT: 210 LBS | HEIGHT: 66 IN

## 2021-03-09 DIAGNOSIS — G89.29 CHRONIC MIDLINE LOW BACK PAIN WITH RIGHT-SIDED SCIATICA: ICD-10-CM

## 2021-03-09 DIAGNOSIS — F41.9 ANXIETY: ICD-10-CM

## 2021-03-09 DIAGNOSIS — F33.1 MODERATE EPISODE OF RECURRENT MAJOR DEPRESSIVE DISORDER (HCC): Primary | ICD-10-CM

## 2021-03-09 DIAGNOSIS — M54.41 CHRONIC MIDLINE LOW BACK PAIN WITH RIGHT-SIDED SCIATICA: ICD-10-CM

## 2021-03-09 PROCEDURE — 99214 OFFICE O/P EST MOD 30 MIN: CPT | Mod: 95,CR | Performed by: FAMILY MEDICINE

## 2021-03-09 RX ORDER — ARIPIPRAZOLE 10 MG/1
10 TABLET ORAL DAILY
COMMUNITY
Start: 2021-03-07

## 2021-03-09 RX ORDER — TRAZODONE HYDROCHLORIDE 50 MG/1
50 TABLET ORAL
COMMUNITY
Start: 2021-03-07 | End: 2021-03-09 | Stop reason: SDUPTHER

## 2021-03-09 RX ORDER — TIZANIDINE 4 MG/1
4 TABLET ORAL EVERY 6 HOURS PRN
Qty: 30 TABLET | Refills: 3 | Status: SHIPPED | OUTPATIENT
Start: 2021-03-09

## 2021-03-09 RX ORDER — TRAZODONE HYDROCHLORIDE 50 MG/1
50-200 TABLET ORAL
Qty: 120 TABLET | Refills: 1 | Status: SHIPPED | OUTPATIENT
Start: 2021-03-09

## 2021-03-09 NOTE — ASSESSMENT & PLAN NOTE
This is a chronic condition.  She has been having back pain for last 6 months.  She is following with physical therapy.  At a previous appointment I did order an x-ray and a Medrol Dosepak as she was in significant pain.  X-ray indicated degenerative disc disease and mild loss of disc height in some levels. She quit the physical therapy after ~1 month as she felt it was making it worse instead of better. So currently the back pain is getting worse and still getting R sided sciatica.     No saddle anesthesia, no urinary retention, no urinary/bowel incontinence, no fevers/chills, age <50

## 2021-03-09 NOTE — PROGRESS NOTES
Virtual Visit: Established Patient   This visit was conducted via Zoom using secure and encrypted videoconferencing technology. The patient was in a private location in the state of [place].    The patient's identity was confirmed and verbal consent was obtained for this virtual visit.    Subjective:   CC:   Chief Complaint   Patient presents with   • Anxiety     worse since hopitalisation   • Medication Refill     alprazolam    • Depression     worse since hopitalisation   • Hospital Follow-up       Sadaf Nunez is a 45 y.o. female presenting for evaluation and management of:    Hospital follow up  3/4/2021: ER for SI and suicide attempt - used broken glass to cut wrists. She was discharged to a psychiatric hospital on a 72 hour hold. The cymbalta was discontinued and she was started on abilify and trazodone. She is supposed to call and schedule with psychiatry, which she will today.    She notes her depression and anxiety are getting worse. She is only getting 3-4 hours of sleep at night. She thinks this is the reason her mood is worsening. She reports no continued thoughts of suicide. She denies an homicidal ideation.     Back pain  This is a chronic condition.  She has been having back pain for last 6 months.  She is following with physical therapy.  At a previous appointment I did order an x-ray and a Medrol Dosepak as she was in significant pain.  X-ray indicated degenerative disc disease and mild loss of disc height in some levels. She quit the physical therapy after ~1 month as she felt it was making it worse instead of better. So currently the back pain is getting worse and still getting R sided sciatica.     No saddle anesthesia, no urinary retention, no urinary/bowel incontinence, no fevers/chills, age <50      ROS   Denies any recent fevers or chills. No chest pains or shortness of breath.     No Known Allergies    Current medicines (including changes today)  Current Outpatient Medications  "  Medication Sig Dispense Refill   • ARIPiprazole (ABILIFY) 10 MG Tab Take 10 mg by mouth every day.     • traZODone (DESYREL) 50 MG Tab Take 1-4 Tablets by mouth every bedtime. 120 tablet 1   • tizanidine (ZANAFLEX) 4 MG Tab Take 1 tablet by mouth every 6 hours as needed. 30 tablet 3   • famotidine (PEPCID) 20 MG Tab Take 1 Tab by mouth 2 times a day. 180 Tab 0     No current facility-administered medications for this visit.       Patient Active Problem List    Diagnosis Date Noted   • Heartburn 09/21/2020   • Back pain 09/21/2020   • Obesity (BMI 30-39.9) 08/24/2020   • Nicotine dependence 08/24/2020   • Insomnia 02/05/2016   • Anxiety 01/29/2016   • Depression 01/29/2016       Family History   Problem Relation Age of Onset   • Diabetes Mother    • Psychiatric Illness Mother    • Alcohol abuse Father    • Psychiatric Illness Father    • Cancer Maternal Grandfather         cancer stomach   • Stroke Maternal Grandmother    • Heart Disease Neg Hx        She  has a past medical history of Anxiety, Anxiety and depression, Bipolar 1 disorder (HCC), Gynecological disorder, Heart burn, Hemorrhagic disorder (HCC), History of abnormal cervical Pap smear, Indigestion, and UTI (lower urinary tract infection).  She  has a past surgical history that includes tonsillectomy; cholecystectomy; gyn surgery (07/1995); anterior and posterior repair (N/A, 5/3/2016); enterocele repair (N/A, 5/3/2016); vaginal suspension (N/A, 5/3/2016); bladder sling female (N/A, 5/3/2016); vaginal hysterectomy scope total (N/A, 5/3/2016); and tubal coagulation laparoscopic bilateral.       Objective:   Ht 1.676 m (5' 6\") Comment: patient reported  Wt 95.3 kg (210 lb) Comment: patient reported  BMI 33.89 kg/m²  RR: 12    Physical Exam:  Constitutional: Alert, no distress, well-groomed.  Skin: No rashes in visible areas.  Eye: Round. Conjunctiva clear, lids normal. No icterus.   ENMT: Lips pink without lesions, good dentition, moist mucous membranes. " Phonation normal.  Neck: No masses, no thyromegaly. Moves freely without pain.  Respiratory: Unlabored respiratory effort, no cough or audible wheeze  Psych: Alert and oriented x3, normal affect and mood.       Assessment and Plan:   The following treatment plan was discussed:     1. Moderate episode of recurrent major depressive disorder (HCC)  2. Anxiety  This is a chronic condition, uncontrolled.  She was in the ER 5 days ago with a suicidal attempt where she attempted to cut her wrist with broken glass.  She was discharged to psychiatric hospital on a 72-hour hold.  She has prior to discharge I discontinued her duloxetine and started her on Abilify and trazodone.  She reports that she is not sleeping very well at all which is worsening her depression and her anxiety.  We did discuss that I cannot refill her alprazolam due to her recent suicide attempt that the risks far outweigh the benefits and she understood.  Therefore, she knows to follow-up with psychiatry as directed by the psychiatric facility but we will try to get her sleeping better by adjusting her trazodone dose until she follows up with them.  -Trazodone  mg nightly    3. Chronic midline low back pain with right-sided sciatica  This is a chronic condition, worsening.  For 6 months she has had low back pain with associated right-sided sciatica.  She had tried physical therapy but stopped after a month as a seem to worsen her pain rather than improvement.  X-ray showed degenerative disc disease and mild loss of disc height and some levels.  We did try referring her to physiatry but they declined the referral.  She does not have any red flag symptoms but she states that the pain continues to worsen so we will send her to neurosurgery for further treatment.  - REFERRAL TO NEUROSURGERY      Follow-up: Return if symptoms worsen or fail to improve.

## 2021-05-18 RX ORDER — TRAZODONE HYDROCHLORIDE 50 MG/1
TABLET ORAL
Refills: 0 | OUTPATIENT
Start: 2021-05-18

## 2021-05-20 RX ORDER — FAMOTIDINE 20 MG/1
TABLET, FILM COATED ORAL
Qty: 180 TABLET | Refills: 0 | Status: SHIPPED | OUTPATIENT
Start: 2021-05-20 | End: 2021-08-17

## 2021-08-17 RX ORDER — FAMOTIDINE 20 MG/1
TABLET, FILM COATED ORAL
Qty: 180 TABLET | Refills: 0 | Status: SHIPPED | OUTPATIENT
Start: 2021-08-17 | End: 2021-11-19

## 2021-11-19 RX ORDER — FAMOTIDINE 20 MG/1
TABLET, FILM COATED ORAL
Qty: 180 TABLET | Refills: 0 | Status: SHIPPED | OUTPATIENT
Start: 2021-11-19

## 2021-11-19 NOTE — TELEPHONE ENCOUNTER
PLEASE CONTACT PATIENT.  Needs an appointment for further refills. She has not been seen in almost a year